# Patient Record
Sex: MALE | Race: WHITE | NOT HISPANIC OR LATINO | Employment: FULL TIME | URBAN - METROPOLITAN AREA
[De-identification: names, ages, dates, MRNs, and addresses within clinical notes are randomized per-mention and may not be internally consistent; named-entity substitution may affect disease eponyms.]

---

## 2017-05-23 ENCOUNTER — OFFICE VISIT (OUTPATIENT)
Dept: FAMILY MEDICINE | Facility: CLINIC | Age: 47
End: 2017-05-23
Payer: COMMERCIAL

## 2017-05-23 VITALS
BODY MASS INDEX: 29.2 KG/M2 | OXYGEN SATURATION: 95 % | DIASTOLIC BLOOD PRESSURE: 82 MMHG | SYSTOLIC BLOOD PRESSURE: 136 MMHG | HEART RATE: 87 BPM | WEIGHT: 204 LBS | HEIGHT: 70 IN | TEMPERATURE: 97.4 F

## 2017-05-23 DIAGNOSIS — E11.65 POORLY CONTROLLED DIABETES MELLITUS (H): Primary | ICD-10-CM

## 2017-05-23 DIAGNOSIS — M79.642 BILATERAL HAND PAIN: ICD-10-CM

## 2017-05-23 DIAGNOSIS — L72.3 SEBACEOUS CYST: ICD-10-CM

## 2017-05-23 DIAGNOSIS — Z12.83 SKIN CANCER SCREENING: ICD-10-CM

## 2017-05-23 DIAGNOSIS — Z13.89 SCREENING FOR DIABETIC PERIPHERAL NEUROPATHY: ICD-10-CM

## 2017-05-23 DIAGNOSIS — B00.1 COLD SORE: ICD-10-CM

## 2017-05-23 DIAGNOSIS — Z91.030 ALLERGIC TO BEES: ICD-10-CM

## 2017-05-23 DIAGNOSIS — D22.9 NUMEROUS MOLES: ICD-10-CM

## 2017-05-23 DIAGNOSIS — M79.641 BILATERAL HAND PAIN: ICD-10-CM

## 2017-05-23 DIAGNOSIS — Z83.3 FAMILY HISTORY OF DIABETES MELLITUS: ICD-10-CM

## 2017-05-23 LAB — HBA1C MFR BLD: 9 % (ref 4.3–6)

## 2017-05-23 PROCEDURE — 99207 C FOOT EXAM  NO CHARGE: CPT | Performed by: INTERNAL MEDICINE

## 2017-05-23 PROCEDURE — 80061 LIPID PANEL: CPT | Performed by: INTERNAL MEDICINE

## 2017-05-23 PROCEDURE — 82043 UR ALBUMIN QUANTITATIVE: CPT | Performed by: INTERNAL MEDICINE

## 2017-05-23 PROCEDURE — 99215 OFFICE O/P EST HI 40 MIN: CPT | Performed by: INTERNAL MEDICINE

## 2017-05-23 PROCEDURE — 83036 HEMOGLOBIN GLYCOSYLATED A1C: CPT | Performed by: INTERNAL MEDICINE

## 2017-05-23 PROCEDURE — 36415 COLL VENOUS BLD VENIPUNCTURE: CPT | Performed by: INTERNAL MEDICINE

## 2017-05-23 PROCEDURE — 80053 COMPREHEN METABOLIC PANEL: CPT | Performed by: INTERNAL MEDICINE

## 2017-05-23 RX ORDER — GLIMEPIRIDE 2 MG/1
TABLET ORAL
Qty: 60 TABLET | Refills: 3 | Status: SHIPPED | OUTPATIENT
Start: 2017-05-23 | End: 2017-05-30

## 2017-05-23 RX ORDER — ATORVASTATIN CALCIUM 20 MG/1
20 TABLET, FILM COATED ORAL DAILY
Qty: 90 TABLET | Refills: 1 | Status: SHIPPED | OUTPATIENT
Start: 2017-05-23 | End: 2022-01-24

## 2017-05-23 RX ORDER — METFORMIN HCL 500 MG
TABLET, EXTENDED RELEASE 24 HR ORAL
Qty: 120 TABLET | Refills: 3 | Status: SHIPPED | OUTPATIENT
Start: 2017-05-23 | End: 2022-01-24

## 2017-05-23 RX ORDER — EPINEPHRINE 0.3 MG/.3ML
0.3 INJECTION SUBCUTANEOUS
Qty: 0.3 ML | Refills: 1 | Status: SHIPPED | OUTPATIENT
Start: 2017-05-23

## 2017-05-23 RX ORDER — ACYCLOVIR 50 MG/G
CREAM TOPICAL
Qty: 5 G | Refills: 3 | Status: SHIPPED | OUTPATIENT
Start: 2017-05-23 | End: 2022-01-24

## 2017-05-23 NOTE — MR AVS SNAPSHOT
After Visit Summary   5/23/2017    Darren Tenorio    MRN: [id number]           Patient Information     Date Of Birth          1970        Visit Information        Provider Department      5/23/2017 4:00 PM Deisy Forte MD [facility]        Today's Diagnoses     Poorly controlled diabetes mellitus (H)    -  1    Allergic to bees        Screening for diabetic peripheral neuropathy        Family history of diabetes mellitus        Bilateral hand pain        Skin cancer screening        Numerous moles        Sebaceous cyst        Cold sore          Care Instructions    Take Metformin 1 tablet daily for 7 days then 2 tablets daily after that  Start taking Amaryl 2 mg daily for one week then go to twice daily.  Labs today  Make appointment with diabetic educator  Make appointment with dermatology and orthopedics  Follow up in one month  Seek sooner medical attention if there is any worsening of symptoms or problems.           Follow-ups after your visit        Additional Services     DERMATOLOGY REFERRAL       Your provider has referred you to: [facility] [place] [phone]   [email or web address]    Please be aware that coverage of these services is subject to the terms and limitations of your health insurance plan.  Call member services at your health plan with any benefit or coverage questions.      Please bring the following with you to your appointment:    (1) Any X-Rays, CTs or MRIs which have been performed.  Contact the facility where they were done to arrange for  prior to your scheduled appointment.    (2) List of current medications  (3) This referral request   (4) Any documents/labs given to you for this referral            DIABETES EDUCATOR REFERRAL       DIABETES SELF MANAGEMENT TRAINING (DSMT)      Your provider has referred you to Diabetes Education: [facility]: Diabetes Education - [facility] [phone]    [email or web address]    Type of training and number of hours: New Diagnosis: Initial group DSMT - 10 hours.      Medicare covers: 10 hours of initial DSMT in 12 month period from the time of first visit, plus 2 hours of follow-up DSMT annually, and additional hours as requested for insulin training.    Diabetes Type: Type 2 - On Oral Medication             Diabetes Co-Morbidities: dyslipidemia               A1C Goal:  <7.0       A1C is: Lab Results       Component                Value               Date                       A1C                      8.9                 07/01/2016              If an urgent visit is needed or A1C is above 12, Care Team to call the Diabetes Education Team at [phone] or send an In Basket message to the Diabetes Education Pool (P DIAB ED-PATIENT CARE).    Diabetes Education Topics: Comprehensive Knowledge Assessment and Instruction    Special Educational Needs Requiring Individual DSMT: None       MEDICAL NUTRITION THERAPY (MNT) for Diabetes    Medical Nutrition Therapy with a Registered Dietitian can be provided in coordination with Diabetes Self-Management Training to assist in achieving optimal diabetes management.    MNT Type and Hours: New diagnosis: Initial MNT - 3 hours                       Medicare will cover: 3 hours initial MNT in 12 month period after first visit, plus 2 hours of follow-up MNT annually    Please be aware that coverage of these services is subject to the terms and limitations of your health insurance plan.  Call member services at your health plan to determine Diabetes Self-Management Training benefits and ask which blood glucose monitor brands are covered by your plan.      Please bring the following with you to your appointment:    (1)  List of current medications   (2)  List of Blood Glucose Monitor brands that are covered by your insurance plan  (3)  Blood Glucose Monitor and log book  (4)   Food records  for the 3 days prior to your visit    The Certified Diabetes Educator may make diabetes medication adjustments per the CDE Protocol and Collaborative Practice Agreement.            ORTHOPEDICS ADULT REFERRAL       Your provider has referred you to: [facility]  [place] [phone]   [email or web address]     Please be aware that coverage of these services is subject to the terms and limitations of your health insurance plan.  Call member services at your health plan with any benefit or coverage questions.      Please bring the following to your appointment:    >>   Any x-rays, CTs or MRIs which have been performed.  Contact the facility where they were done to arrange for  prior to your scheduled appointment.    >>   List of current medications   >>   This referral request   >>   Any documents/labs given to you for this referral                  Who to contact     If you have questions or need follow up information about today's clinic visit or your schedule please contact [facility] directly at [phone].  Normal or non-critical lab and imaging results will be communicated to you by [employer], letter or phone within 4 business days after the clinic has received the results. If you do not hear from us within 7 days, please contact the clinic through [employer] or phone. If you have a critical or abnormal lab result, we will notify you by phone as soon as possible.  Submit refill requests through [employer] or call your pharmacy and they will forward the refill request to us. Please allow 3 business days for your refill to be completed.          Additional Information About Your Visit        [employer] Information     [employer] gives you secure access to your electronic health record. If you see a primary care provider, you can also send messages to your care team and make appointments. If you have questions, please call your primary care clinic.  If you do not have a  "primary care provider, please call [phone] and they will assist you.        Care EveryWhere ID     This is your Care EveryWhere ID. This could be used by other organizations to access your [facility] medical records  [id number]        Your Vitals Were     Pulse Temperature Height Pulse Oximetry BMI (Body Mass Index)       87 97.4  F (36.3  C) (Tympanic) 5' 9.5\" (1.765 m) 95% 29.69 kg/m2        Blood Pressure from Last 3 Encounters:   05/23/17 136/82   11/30/16 118/78   10/11/16 124/60    Weight from Last 3 Encounters:   05/23/17 204 lb (92.5 kg)   11/30/16 206 lb (93.4 kg)   10/11/16 200 lb (90.7 kg)              We Performed the Following     Albumin Random Urine Quantitative     Comprehensive metabolic panel     DERMATOLOGY REFERRAL     DIABETES EDUCATOR REFERRAL     FOOT EXAM  NO CHARGE [86972.114]     Hemoglobin A1c     Lipid Profile     ORTHOPEDICS ADULT REFERRAL          Today's Medication Changes          These changes are accurate as of: 5/23/17  4:35 PM.  If you have any questions, ask your nurse or doctor.               Start taking these medicines.        Dose/Directions    acyclovir 5 % cream   Commonly known as:  ZOVIRAX   Used for:  Cold sore   Started by:  Deisy Forte MD        Apply topically 5 times daily   Quantity:  5 g   Refills:  3       aspirin 81 MG EC tablet   Used for:  Poorly controlled diabetes mellitus (H)   Started by:  Deisy Forte MD        Dose:  81 mg   Take 1 tablet (81 mg) by mouth daily   Quantity:  90 tablet   Refills:  3       atorvastatin 20 MG tablet   Commonly known as:  LIPITOR   Used for:  Poorly controlled diabetes mellitus (H)   Started by:  Deisy Forte MD        Dose:  20 mg   Take 1 tablet (20 mg) by mouth daily   Quantity:  90 tablet   Refills:  1       blood glucose lancets standard   Commonly known as:  no brand specified   Used for:  Poorly controlled diabetes mellitus (H)   Started by:  Deisy Forte MD        Use to test blood sugar 3 " times daily or as directed.   Quantity:  1 Box   Refills:  11       blood glucose monitoring meter device kit   Commonly known as:  no brand specified   Used for:  Poorly controlled diabetes mellitus (H)   Started by:  Deisy Forte MD        Use to test blood sugar as directed.   Quantity:  1 kit   Refills:  0       blood glucose monitoring test strip   Commonly known as:  no brand specified   Used for:  Poorly controlled diabetes mellitus (H)   Started by:  Deisy Forte MD        Use to test blood sugars 3 times daily or as directed   Quantity:  100 strip   Refills:  1       glimepiride 2 MG tablet   Commonly known as:  AMARYL   Used for:  Poorly controlled diabetes mellitus (H)   Started by:  Deisy Forte MD        Take one tablet daily for one week then take one tablet twice a day after that   Quantity:  60 tablet   Refills:  3       metFORMIN 500 MG 24 hr tablet   Commonly known as:  GLUCOPHAGE-XR   Used for:  Poorly controlled diabetes mellitus (H)   Started by:  Deisy Forte MD        Take one tablet daily for 7 days then go to two tablets daily   Quantity:  120 tablet   Refills:  3            Where to get your medicines      These medications were sent to [place] [address] [place] [address] [address] [place] [address] [address] [place] [id number]     Phone:  [phone]     acyclovir 5 % cream    blood glucose lancets standard    blood glucose monitoring meter device kit    blood glucose monitoring test strip    EPINEPHrine 0.3 MG/0.3ML injection    glimepiride 2 MG tablet    metFORMIN 500 MG 24 hr tablet         Some of these will need a paper prescription and others can be bought over the counter.  Ask your nurse if you have questions.     Bring a paper prescription for each of these medications     atorvastatin 20 MG tablet       You don't need a prescription for these medications     aspirin 81 MG EC tablet                Primary Care Provider Office Phone # Fax #    Deisy Forte  [phone]       [facility]    [address] [facility] [address]  [facility] [address]        Thank you!     Thank you for choosing [facility]  for your care. Our goal is always to provide you with excellent care. Hearing back from our patients is one way we can continue to improve our services. Please take a few minutes to complete the written survey that you may receive in the mail after your visit with us. Thank you!             Your Updated Medication List - Protect others around you: Learn how to safely use, store and throw away your medicines at www.disposemymeds.org.          This list is accurate as of: 5/23/17  4:35 PM.  Always use your most recent med list.                   Brand Name Dispense Instructions for use    acyclovir 5 % cream    ZOVIRAX    5 g    Apply topically 5 times daily       aspirin 81 MG EC tablet     90 tablet    Take 1 tablet (81 mg) by mouth daily       atorvastatin 20 MG tablet    LIPITOR    90 tablet    Take 1 tablet (20 mg) by mouth daily       blood glucose lancets standard    no brand specified    1 Box    Use to test blood sugar 3 times daily or as directed.       blood glucose monitoring meter device kit    no brand specified    1 kit    Use to test blood sugar as directed.       blood glucose monitoring test strip    no brand specified    100 strip    Use to test blood sugars 3 times daily or as directed       EPINEPHrine 0.3 MG/0.3ML injection     0.3 mL    Inject 0.3 mLs (0.3 mg) into the muscle once as needed for anaphylaxis       glimepiride 2 MG tablet    AMARYL    60 tablet    Take one tablet daily for one week then take one tablet twice a day after that       metFORMIN 500 MG 24 hr tablet    GLUCOPHAGE-XR    120 tablet    Take one tablet daily for 7 days then go to two tablets daily

## 2017-05-23 NOTE — NURSING NOTE
"Chief Complaint   Patient presents with     Diabetes     Mole     on back that is changing colors       Initial /82 (BP Location: Right arm, Patient Position: Chair, Cuff Size: Adult Regular)  Pulse 87  Temp 97.4  F (36.3  C) (Tympanic)  Ht 5' 9.5\" (1.765 m)  Wt 204 lb (92.5 kg)  SpO2 95%  BMI 29.69 kg/m2 Estimated body mass index is 29.69 kg/(m^2) as calculated from the following:    Height as of this encounter: 5' 9.5\" (1.765 m).    Weight as of this encounter: 204 lb (92.5 kg).  Medication Reconciliation: complete   Tanya Clarke        "

## 2017-05-23 NOTE — PROGRESS NOTES
SUBJECTIVE:                                                    Darren Tenorio is a 46 year old male who presents to clinic today for the following health issues:      Diabetes Follow-up      Patient is checking blood sugars: rarely.  Results range from 300+ patient thinks monitor isn't working correctly    Diabetic concerns: None and blood sugar frequently over 200     Symptoms of hypoglycemia (low blood sugar): none     Paresthesias (numbness or burning in feet) or sores: No     Date of last diabetic eye exam: never     Amount of exercise or physical activity: 6-7 days/week for an average of 30-45 minutes    Problems taking medications regularly: No    Medication side effects: none    Diet: regular (no restrictions)  Patient states that he was not aware he was supposed to follow up every 3 months for his diabetes, but he was previously informed  He has not visited a diabetic educator yet, he thought that they were supposed to call him  He would like to visit one closer to his house in [place], he works in [place]  He believes that he needs a new blood sugar monitor because for the past couple of weeks his readings have been over 300 and he does not believe that they are actually that high  He reports that at one point he measured his blood sugars and the monitor read 320, then when he measured it again right after it read 280  He has not started any of the medications that were prescribed to him for his diabetes  He also complains of additional concerns including red spots on his arms, a discolored red mole on his back, and a cyst on his back that he has had for a long time (30+ years) that has now recently been growing is size    Trigger finger  Patient has a history of trigger finger bilaterally  He was referred to a hand specialist and received 2-3 injections in the last 5 years  The injections helped initially, but the effects eventually wear off for him  Currently the effect of the injections are wearing  off and his symptoms are returning  He reports that the last time he saw his hand specialist he was told he should get surgery instead of more injections  He needs to be able to use his hands properly for his job as a     Cold Sores  Patient needs a refill of the ointment he uses for cold sores  This medication has worked well for him and he would like to continue  Has tried OTC Abreva and has not noticed any significant improvement  Has not taken oral medication for this and does not want to start      Problem list and histories reviewed & adjusted, as indicated.  Additional history: as documented    Patient Active Problem List   Diagnosis     Dizzy spells     Tobacco abuse     Type 2 diabetes mellitus without complication (HCC)     Hyperlipidemia LDL goal <100     Past Surgical History:   Procedure Laterality Date     none         Social History   Substance Use Topics     Smoking status: Former Smoker     Packs/day: 0.20     Types: Cigarettes     Quit date: 4/20/2014     Smokeless tobacco: Never Used     Alcohol use No      Comment: Quit 2010     Family History   Problem Relation Age of Onset     Hypertension Father      C.A.D. Father      Coronary Artery Disease Mother      DIABETES Paternal Uncle          Current Outpatient Prescriptions   Medication Sig Dispense Refill     EPINEPHrine 0.3 MG/0.3ML injection Inject 0.3 mLs (0.3 mg) into the muscle once as needed for anaphylaxis 0.3 mL 1     Allergies   Allergen Reactions     Bees        Reviewed and updated as needed this visit by clinical staff  Tobacco  Allergies  Meds  Soc Hx      Reviewed and updated as needed this visit by Provider         ROS:  Constitutional, HEENT, cardiovascular, pulmonary, gi and gu systems are negative, except as otherwise noted.    POS for red spots on arms bilaterally, discolored mole on back, cyst on back, trigger finger bilaterally      This document serves as a record of the services and decisions personally  "performed and made by Deisy Forte MD. It was created on her behalf by Jovan Roblero, a trained medical scribe. The creation of this document is based on the provider's statements to the medical scribe.    Scriblulu Roblero 4:08 PM, May 23, 2017    OBJECTIVE:                                                    /82 (BP Location: Right arm, Patient Position: Chair, Cuff Size: Adult Regular)  Pulse 87  Temp 97.4  F (36.3  C) (Tympanic)  Ht 1.765 m (5' 9.5\")  Wt 92.5 kg (204 lb)  SpO2 95%  BMI 29.69 kg/m2  Body mass index is 29.69 kg/(m^2).  GENERAL APPEARANCE: healthy, alert and no distress  SKIN: Numerous moles and freckles on his back, large cyst on back, small red bumps on arms bilaterally  PSYCH: mentation appears normal and affect normal/bright       ASSESSMENT/PLAN:                                                      Darren was seen today for diabetes and mole.    Diagnoses and all orders for this visit:    Poorly controlled diabetes mellitus (H)  Patient was in denial about his diabetes diagnosis, but is now accepting it  He does not yet seem fully dedicated to controlling this condition  Counseled him about the additional health complications that arise with poorly controlled diabetes  Also informed him that poorly controlled diabetes affects healing and he would not be recommended for the surgeries that he is thinking about getting until his diabetes is under better control  His A1C has increased more since his last visits  Lab Results   Component Value Date    A1C 9.0 05/23/2017    A1C 8.9 07/01/2016    A1C 7.2 11/09/2015    A1C 7.7 08/26/2014   After discussing these factors and reviewing his elevated A1C results the patient seemed more inclined to control his diabetes and stated he will follow the instructions he was given  Advised patient to be careful about low blood sugars when beginning his medications and instructed him how to manage hypoglycemic events  He drives and rides motorcycle " so he should always be prepared to increase his blood sugar levels  He is going to  his medications and begin using them as prescribed  He will take Metformin 1 tablet daily for 7 days then 2 tablets daily as well as take Amaryl 2 mg daily for 7 days then Amaryl 2 mg twice daily.  He will make an appointment with a diabetic educator  He was informed that he has to schedule his appointments on his own, they will not contact him first  -     blood glucose monitoring (NO BRAND SPECIFIED) test strip; Use to test blood sugars 3 times daily or as directed  -     blood glucose monitoring (NO BRAND SPECIFIED) meter device kit; Use to test blood sugar as directed.  -     blood glucose (NO BRAND SPECIFIED) lancets standard; Use to test blood sugar 3 times daily or as directed.  -     metFORMIN (GLUCOPHAGE-XR) 500 MG 24 hr tablet; Take one tablet daily for 7 days then go to two tablets daily  -     glimepiride (AMARYL) 2 MG tablet; Take one tablet daily for one week then take one tablet twice a day after that  -     aspirin 81 MG EC tablet; Take 1 tablet (81 mg) by mouth daily  -     atorvastatin (LIPITOR) 20 MG tablet; Take 1 tablet (20 mg) by mouth daily  -     Hemoglobin A1c  -     Comprehensive metabolic panel  -     Lipid Profile  -     DIABETES EDUCATOR REFERRAL  Patient was informed about his A1c result today    Allergic to bees  Continue to keep epinephrine on hand for bee stings  -     Albumin Random Urine Quantitative  -     EPINEPHrine 0.3 MG/0.3ML injection; Inject 0.3 mLs (0.3 mg) into the muscle once as needed for anaphylaxis    Screening for diabetic peripheral neuropathy  -     deferred     Family history of diabetes mellitus  He has seen the effects of diabetes first hand in his family  Comments:  aunts and uncle    Bilateral hand pain  He will schedule an appointment with orthopedics  Will discuss treatment options for his trigger finger  -     ORTHOPEDICS ADULT REFERRAL    Skin cancer screening  He  will schedule an appointment with dermatology  Will get his moles, rash, and cysts evaluated  -     DERMATOLOGY REFERRAL    Numerous moles  See the note above  -     DERMATOLOGY REFERRAL    Sebaceous cyst with no signs of infection   It is 2cm  See the note above  Comments:  lower back  Orders:  -     DERMATOLOGY REFERRAL    Cold sore  He has been using zovirax for his cold sores and would like to continue  Abreva has not helped with cold sores in the past  He does not want to take an oral medication  -     acyclovir (ZOVIRAX) 5 % cream; Apply topically 5 times daily  Wanted zovirax.     Other orders  -     Cancel: CREATININE    The total visit time was 40 minutes more  than 50% was spent in counseling and coordination of care as discussed above.    Follow up in one month  Patient was advised to seek sooner medical attention if there is any new or worsening symptoms    The information in this document, created by the medical scribe for me, accurately reflects the services I personally performed and the decisions made by me. I have reviewed and approved this document for accuracy prior to leaving the patient care area.  Deisy Forte MD  4:47 PM, 05/23/17    Deisy Forte MD  [facility]

## 2017-05-23 NOTE — PATIENT INSTRUCTIONS
Take Metformin 1 tablet daily for 7 days then 2 tablets daily after that  Start taking Amaryl 2 mg daily for one week then go to twice daily.  Labs today  Make appointment with diabetic educator  Make appointment with dermatology and orthopedics  Follow up in one month  Seek sooner medical attention if there is any worsening of symptoms or problems.

## 2017-05-24 LAB
ALBUMIN SERPL-MCNC: 4.6 G/DL (ref 3.4–5)
ALP SERPL-CCNC: 76 U/L (ref 40–150)
ALT SERPL W P-5'-P-CCNC: 52 U/L (ref 0–70)
ANION GAP SERPL CALCULATED.3IONS-SCNC: 8 MMOL/L (ref 3–14)
AST SERPL W P-5'-P-CCNC: 21 U/L (ref 0–45)
BILIRUB SERPL-MCNC: 0.3 MG/DL (ref 0.2–1.3)
BUN SERPL-MCNC: 19 MG/DL (ref 7–30)
CALCIUM SERPL-MCNC: 9.2 MG/DL (ref 8.5–10.1)
CHLORIDE SERPL-SCNC: 107 MMOL/L (ref 94–109)
CHOLEST SERPL-MCNC: 227 MG/DL
CO2 SERPL-SCNC: 24 MMOL/L (ref 20–32)
CREAT SERPL-MCNC: 0.81 MG/DL (ref 0.66–1.25)
CREAT UR-MCNC: 116 MG/DL
GFR SERPL CREATININE-BSD FRML MDRD: ABNORMAL ML/MIN/1.7M2
GLUCOSE SERPL-MCNC: 167 MG/DL (ref 70–99)
HDLC SERPL-MCNC: 32 MG/DL
LDLC SERPL CALC-MCNC: 145 MG/DL
MICROALBUMIN UR-MCNC: 13 MG/L
MICROALBUMIN/CREAT UR: 11.12 MG/G CR (ref 0–17)
NONHDLC SERPL-MCNC: 195 MG/DL
POTASSIUM SERPL-SCNC: 3.9 MMOL/L (ref 3.4–5.3)
PROT SERPL-MCNC: 8 G/DL (ref 6.8–8.8)
SODIUM SERPL-SCNC: 139 MMOL/L (ref 133–144)
TRIGL SERPL-MCNC: 252 MG/DL

## 2017-05-24 NOTE — PROGRESS NOTES
This is to inform you regarding your test result.    Glucose which is your blood sugar is elevated.  HbA1c which is average glucose during last 3 months is 9%.  Your diabetes is poorly controlled.  We discussed the management during your office visit.  Liver and kidney function is normal.  Your total cholesterol is elevated.  The triglycerides are high. Lowering  the amount of sugar ,alcohol and sweets in the diet helps to control this.Exercise and weight loss helps.  HDL which is called good cholesterol is low.  Your LDL which is called bad cholesterol is elevated.  Eat low cholesterol low fat  diet and do regular physical activity. Avoid high sugar containing food.  Start taking lipitor which was discussed with you during office visit.  Urine for Microalbumin is normal.            Dr.Nasima Reanna MD,FACP

## 2017-05-30 ENCOUNTER — TELEPHONE (OUTPATIENT)
Dept: FAMILY MEDICINE | Facility: CLINIC | Age: 47
End: 2017-05-30

## 2017-05-30 DIAGNOSIS — E11.65 POORLY CONTROLLED DIABETES MELLITUS (H): ICD-10-CM

## 2017-05-30 RX ORDER — GLIMEPIRIDE 2 MG/1
TABLET ORAL
Qty: 60 TABLET | Refills: 3
Start: 2017-05-30 | End: 2018-07-02

## 2017-05-30 NOTE — TELEPHONE ENCOUNTER
Spoke to patient and discussed hypoglycemia and its management.  Lower the glimepiride dose from 2 mg to one mg daily.  Update me in one week how your sugars are doing?  Dr.Nasima Reanna MD

## 2017-05-30 NOTE — TELEPHONE ENCOUNTER
Reason for call:  Patient reporting a symptom    Symptom or request: Fatigue, weakness, and     Duration (how long have symptoms been present): Since he has been on the medication.     Have you been treated for this before? Yes    Additional comments: Darren Tenorio said that he was told to up his dosage after a week and he does think that is going to help. He is not feeling well.     Phone Number patient can be reached at:  Home number on file [phone] (home)    Best Time:  ASAP     Can we leave a detailed message on this number:  YES    Call taken on 5/30/2017 at 9:29 AM by Emma Yoo

## 2017-05-30 NOTE — TELEPHONE ENCOUNTER
We could but patient wanted the ointment.  So check with him to see if he is willing to switch to valtrex  or Is he going to pay out of pocket for this?  Dr.Nasima Reanna MD

## 2017-05-30 NOTE — TELEPHONE ENCOUNTER
"PCP:     Pt just started taking Metformin and Glimepiride last week - verified dosing, Pt is taking what has been prescribed.   Pt states he feels very weak at times, Pt states his blood sugars have been on the lower end (70's and 80's)  Pt states that these are blood sugars he is taking before meals. (testing TID and morning fasting)  Pt states that he sometimes feels \"drunk\" when his sugars are in the 70's. He feels better after having some sugar.   Pt was advised to increase his dosing of these medications after one week, however would like to touch base with you first.   Please advise.     Iwona Rodriguez RN     "

## 2017-05-30 NOTE — TELEPHONE ENCOUNTER
Pharmacy requesting PA for Zovirax 5%, insurance prefers: acyclovir capsules/tablets, famciclovir tablet, OR valacyclovir tablet. Can any of these alternatives replace this topical?    Mohinder Gonzalez CMA

## 2017-05-31 NOTE — TELEPHONE ENCOUNTER
Patient did mention that he has only had effective results with the Zovirax cream and has failed with several oral treatments. I submitted a PA for the cream.    Mohinder Gonzalez, CMA

## 2017-06-08 ENCOUNTER — TELEPHONE (OUTPATIENT)
Dept: FAMILY MEDICINE | Facility: CLINIC | Age: 47
End: 2017-06-08

## 2017-06-08 NOTE — TELEPHONE ENCOUNTER
Called the Pt and relayed the below information.   Pt expressed understanding and agrees with plan of care.     Iwona Rodriguez RN

## 2017-06-08 NOTE — TELEPHONE ENCOUNTER
Reason for call:  Other   Patient called regarding (reason for call): prescription for metFORMIN (GLUCOPHAGE-XR) 500 MG 24 hr tablet. Patient states this medication is not agreeing with him and he is having too many side effects. Pt asked to speak with Dr. Forte or her nurse. Please advise.   Additional comments: n/a    Phone number to reach patient:  Home number on file [phone] (home)    Best Time:  Today    Can we leave a detailed message on this number?  YES

## 2017-06-08 NOTE — TELEPHONE ENCOUNTER
"Has been on Metformin for two weeks in addition to 1 mg Glimeperide. \"Cant' tolerate these side effects!\"     States he actually stopped Glimeperide on Monday 6/5/17  due to \"all kinds of side effects. Not sure which medication was the culprit\".    [facility] terrible stomach pain (6 inches below left nipple area; burning, improved but still there all the time)  headache, constantly, daily, on top of head. \"This is not a virus\".  Reports negative exam/CT at hospital. Having somewhat looser stools, not really diarrhea, alternating with constipation.   \"I feel terrible and I know it's these medications.\"   States \"I can't take these any more\".  Most recent BS: 160 and 162 past 2 days.    Since stopping the Glimeperide on Monday, he has no improvement. He is convinced that it's the Metformin.  Wanting to d/c and restart the Glimeperide, or any other recommendations from Dr. Forte.  Please advise.  Thank you,   Rhonda Bradley, RN      "

## 2017-06-09 ENCOUNTER — TELEPHONE (OUTPATIENT)
Dept: FAMILY MEDICINE | Facility: CLINIC | Age: 47
End: 2017-06-09

## 2017-06-09 NOTE — TELEPHONE ENCOUNTER
Reason for Call:  Medication or medication refill:    Do you use a [facility]?  Name of the pharmacy and phone number for the current request:    [facility] [id number] [place] [address] .    Name of the medication requested: acyclovir (ZOVIRAX) 5 % cream THIS IS NOT COVERED By Insurance.    Other request: Insurance with cover a pill form, or prescribe another cream    Can we leave a detailed message on this number? YES    Phone number pharmacy can be reached at: [phone]    Best Time: any time    Call taken on 6/9/2017 at 10:35 AM by oLr Matt  .

## 2017-06-09 NOTE — TELEPHONE ENCOUNTER
As it notes under the PA encounter, this request was denied by the insurance stating Zovirax cream is a plan benefit exclusion. If patient would like he can appeal to his insurance company to see if they might be willing to change their mind otherwise we have done all we can here at [place] office. I had sent patient a [employer] message explaining all of this.    I will now call the pharmacy to let them know. And also left a detailed voice message letting the patient know that we are waiting on him to let us know if he needs a script for the oral formulation.    Mohinder Gonzalez, [facility]

## 2019-05-03 ENCOUNTER — HOSPITAL ENCOUNTER (EMERGENCY)
Facility: CLINIC | Age: 49
Discharge: HOME | End: 2019-05-03
Attending: EMERGENCY MEDICINE | Admitting: EMERGENCY MEDICINE
Payer: COMMERCIAL

## 2019-05-03 ENCOUNTER — APPOINTMENT (OUTPATIENT)
Dept: GENERAL RADIOLOGY | Facility: CLINIC | Age: 49
End: 2019-05-03
Attending: EMERGENCY MEDICINE
Payer: COMMERCIAL

## 2019-05-03 VITALS
DIASTOLIC BLOOD PRESSURE: 85 MMHG | BODY MASS INDEX: 31.48 KG/M2 | OXYGEN SATURATION: 97 % | WEIGHT: 216.3 LBS | SYSTOLIC BLOOD PRESSURE: 146 MMHG | TEMPERATURE: 97.1 F | RESPIRATION RATE: 18 BRPM

## 2019-05-03 DIAGNOSIS — R00.2 PALPITATIONS: ICD-10-CM

## 2019-05-03 LAB
ALBUMIN SERPL-MCNC: 4.1 G/DL (ref 3.4–5)
ALP SERPL-CCNC: 89 U/L (ref 40–150)
ALT SERPL W P-5'-P-CCNC: 50 U/L (ref 0–70)
ANION GAP SERPL CALCULATED.3IONS-SCNC: 3 MMOL/L (ref 3–14)
AST SERPL W P-5'-P-CCNC: 21 U/L (ref 0–45)
BASOPHILS # BLD AUTO: 0.1 10E9/L (ref 0–0.2)
BASOPHILS NFR BLD AUTO: 1.5 %
BILIRUB SERPL-MCNC: 0.3 MG/DL (ref 0.2–1.3)
BUN SERPL-MCNC: 16 MG/DL (ref 7–30)
CALCIUM SERPL-MCNC: 8.7 MG/DL (ref 8.5–10.1)
CHLORIDE SERPL-SCNC: 106 MMOL/L (ref 94–109)
CO2 SERPL-SCNC: 29 MMOL/L (ref 20–32)
CREAT SERPL-MCNC: 0.91 MG/DL (ref 0.66–1.25)
D DIMER PPP FEU-MCNC: 0.3 UG/ML FEU (ref 0–0.5)
DIFFERENTIAL METHOD BLD: NORMAL
EOSINOPHIL NFR BLD AUTO: 3.9 %
ERYTHROCYTE [DISTWIDTH] IN BLOOD BY AUTOMATED COUNT: 12.5 % (ref 10–15)
GFR SERPL CREATININE-BSD FRML MDRD: >90 ML/MIN/{1.73_M2}
GLUCOSE SERPL-MCNC: 261 MG/DL (ref 70–99)
HCT VFR BLD AUTO: 42.1 % (ref 40–53)
HGB BLD-MCNC: 14.4 G/DL (ref 13.3–17.7)
IMM GRANULOCYTES # BLD: 0 10E9/L (ref 0–0.4)
IMM GRANULOCYTES NFR BLD: 0.6 %
INR PPP: 1 (ref 0.86–1.14)
LYMPHOCYTES # BLD AUTO: 2.1 10E9/L (ref 0.8–5.3)
LYMPHOCYTES NFR BLD AUTO: 31.1 %
MCH RBC QN AUTO: 28.9 PG (ref 26.5–33)
MCHC RBC AUTO-ENTMCNC: 34.2 G/DL (ref 31.5–36.5)
MCV RBC AUTO: 85 FL (ref 78–100)
MONOCYTES # BLD AUTO: 0.6 10E9/L (ref 0–1.3)
MONOCYTES NFR BLD AUTO: 8.5 %
NEUTROPHILS # BLD AUTO: 3.7 10E9/L (ref 1.6–8.3)
NEUTROPHILS NFR BLD AUTO: 54.4 %
NRBC # BLD AUTO: 0 10*3/UL
NRBC BLD AUTO-RTO: 0 /100
PLATELET # BLD AUTO: 231 10E9/L (ref 150–450)
POTASSIUM SERPL-SCNC: 3.9 MMOL/L (ref 3.4–5.3)
PROT SERPL-MCNC: 7.7 G/DL (ref 6.8–8.8)
RBC # BLD AUTO: 4.98 10E12/L (ref 4.4–5.9)
SODIUM SERPL-SCNC: 138 MMOL/L (ref 133–144)
TROPONIN I SERPL-MCNC: <0.015 UG/L (ref 0–0.04)
TSH SERPL DL<=0.005 MIU/L-ACNC: 0.88 MU/L (ref 0.4–4)
WBC # BLD AUTO: 6.9 10E9/L (ref 4–11)

## 2019-05-03 PROCEDURE — 84484 ASSAY OF TROPONIN QUANT: CPT | Performed by: EMERGENCY MEDICINE

## 2019-05-03 PROCEDURE — 80053 COMPREHEN METABOLIC PANEL: CPT | Performed by: EMERGENCY MEDICINE

## 2019-05-03 PROCEDURE — 85025 COMPLETE CBC W/AUTO DIFF WBC: CPT | Performed by: EMERGENCY MEDICINE

## 2019-05-03 PROCEDURE — 84443 ASSAY THYROID STIM HORMONE: CPT | Performed by: EMERGENCY MEDICINE

## 2019-05-03 PROCEDURE — 99285 EMERGENCY DEPT VISIT HI MDM: CPT | Mod: 25

## 2019-05-03 PROCEDURE — 71046 X-RAY EXAM CHEST 2 VIEWS: CPT | Mod: TC

## 2019-05-03 PROCEDURE — 96360 HYDRATION IV INFUSION INIT: CPT

## 2019-05-03 PROCEDURE — 99284 EMERGENCY DEPT VISIT MOD MDM: CPT | Mod: 25 | Performed by: EMERGENCY MEDICINE

## 2019-05-03 PROCEDURE — 85379 FIBRIN DEGRADATION QUANT: CPT | Performed by: EMERGENCY MEDICINE

## 2019-05-03 PROCEDURE — 93010 ELECTROCARDIOGRAM REPORT: CPT | Mod: Z6 | Performed by: EMERGENCY MEDICINE

## 2019-05-03 PROCEDURE — 25000128 H RX IP 250 OP 636: Performed by: EMERGENCY MEDICINE

## 2019-05-03 PROCEDURE — 25000132 ZZH RX MED GY IP 250 OP 250 PS 637: Performed by: EMERGENCY MEDICINE

## 2019-05-03 PROCEDURE — 85610 PROTHROMBIN TIME: CPT | Performed by: EMERGENCY MEDICINE

## 2019-05-03 PROCEDURE — 93005 ELECTROCARDIOGRAM TRACING: CPT

## 2019-05-03 RX ORDER — ASPIRIN 81 MG/1
81 TABLET, CHEWABLE ORAL ONCE
Status: COMPLETED | OUTPATIENT
Start: 2019-05-03 | End: 2019-05-03

## 2019-05-03 RX ORDER — SODIUM CHLORIDE 9 MG/ML
1000 INJECTION, SOLUTION INTRAVENOUS CONTINUOUS
Status: DISCONTINUED | OUTPATIENT
Start: 2019-05-03 | End: 2019-05-03 | Stop reason: HOSPADM

## 2019-05-03 RX ADMIN — SODIUM CHLORIDE 1000 ML: 9 INJECTION, SOLUTION INTRAVENOUS at 14:55

## 2019-05-03 RX ADMIN — ASPIRIN 81 MG 81 MG: 81 TABLET ORAL at 15:59

## 2019-05-03 NOTE — ED TRIAGE NOTES
States having episodes where his heart is beating fast, states this started a couple of days ago and only lasted a few seconds. Today the episodes have been more frequent and lasting longer.
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
## 2019-05-03 NOTE — ED AVS SNAPSHOT
[facility] Emergency Department  [address] [place] [address]  Phone:  [phone]  Fax:  [phone]                                    Darren Tenorio   MRN: [id number]    Department:  [facility] Emergency Department   Date of Visit:  5/3/2019           After Visit Summary Signature Page    I have received my discharge instructions, and my questions have been answered. I have discussed any challenges I see with this plan with the nurse or doctor.    ..........................................................................................................................................  Patient/Patient Representative Signature      ..........................................................................................................................................  Patient Representative Print Name and Relationship to Patient    ..................................................               ................................................  Date                                   Time    ..........................................................................................................................................  Reviewed by Signature/Title    ...................................................              ..............................................  Date                                               Time          22EPIC Rev 08/18

## 2019-06-25 ENCOUNTER — OFFICE VISIT (OUTPATIENT)
Dept: FAMILY MEDICINE | Facility: CLINIC | Age: 49
End: 2019-06-25
Payer: COMMERCIAL

## 2019-06-25 VITALS
RESPIRATION RATE: 12 BRPM | DIASTOLIC BLOOD PRESSURE: 82 MMHG | TEMPERATURE: 97 F | HEART RATE: 70 BPM | SYSTOLIC BLOOD PRESSURE: 120 MMHG | HEIGHT: 71 IN | BODY MASS INDEX: 29.82 KG/M2 | WEIGHT: 213 LBS

## 2019-06-25 DIAGNOSIS — L08.9 SOFT TISSUE INFECTION: Primary | ICD-10-CM

## 2019-06-25 LAB
BASOPHILS # BLD AUTO: 0.1 10E9/L (ref 0–0.2)
BASOPHILS NFR BLD AUTO: 1 %
DIFFERENTIAL METHOD BLD: NORMAL
EOSINOPHIL # BLD AUTO: 0.3 10E9/L (ref 0–0.7)
EOSINOPHIL NFR BLD AUTO: 3.3 %
ERYTHROCYTE [DISTWIDTH] IN BLOOD BY AUTOMATED COUNT: 13.1 % (ref 10–15)
HCT VFR BLD AUTO: 42.8 % (ref 40–53)
HGB BLD-MCNC: 14.2 G/DL (ref 13.3–17.7)
LYMPHOCYTES # BLD AUTO: 3 10E9/L (ref 0.8–5.3)
LYMPHOCYTES NFR BLD AUTO: 29.8 %
MCH RBC QN AUTO: 28.9 PG (ref 26.5–33)
MCHC RBC AUTO-ENTMCNC: 33.2 G/DL (ref 31.5–36.5)
MCV RBC AUTO: 87 FL (ref 78–100)
MONOCYTES # BLD AUTO: 1 10E9/L (ref 0–1.3)
MONOCYTES NFR BLD AUTO: 10 %
NEUTROPHILS # BLD AUTO: 5.7 10E9/L (ref 1.6–8.3)
NEUTROPHILS NFR BLD AUTO: 55.9 %
PLATELET # BLD AUTO: 223 10E9/L (ref 150–450)
RBC # BLD AUTO: 4.92 10E12/L (ref 4.4–5.9)
WBC # BLD AUTO: 10.2 10E9/L (ref 4–11)

## 2019-06-25 PROCEDURE — 85025 COMPLETE CBC W/AUTO DIFF WBC: CPT | Performed by: NURSE PRACTITIONER

## 2019-06-25 PROCEDURE — 99214 OFFICE O/P EST MOD 30 MIN: CPT | Performed by: NURSE PRACTITIONER

## 2019-06-25 PROCEDURE — 36415 COLL VENOUS BLD VENIPUNCTURE: CPT | Performed by: NURSE PRACTITIONER

## 2019-06-25 RX ORDER — NAPROXEN 500 MG/1
500 TABLET ORAL 2 TIMES DAILY WITH MEALS
Qty: 10 TABLET | Refills: 0 | Status: SHIPPED | OUTPATIENT
Start: 2019-06-25 | End: 2022-01-29

## 2019-06-25 ASSESSMENT — PAIN SCALES - GENERAL: PAINLEVEL: EXTREME PAIN (8)

## 2019-06-25 ASSESSMENT — MIFFLIN-ST. JEOR: SCORE: 1843.29

## 2019-06-25 NOTE — PATIENT INSTRUCTIONS
CBC today.  Take antibiotic and pain medication as prescribed.  Discussed signs and symptoms to return to the ED for and potential ENT referral if antibiotics are not improving infection    Please call or return to clinic for any questions, concerns, or symptoms that are not improving or becoming worse.    Kacy Tee, CNP

## 2019-06-25 NOTE — PROGRESS NOTES
"Subjective     Darren Tenorio is a 49 year old male who presents to clinic today for the following health issues:    HPI   Concern - Facial Infection   Onset: Started a day ago.     Description:   Patient stated that pain started on the left side of his face near his gland and now has spread up his face to his temple up. Swollen on the left side of face.     Intensity: severe, 8/10    Progression of Symptoms:  worsening and constant    Accompanying Signs & Symptoms:  Patient is now experiencing ear pain on the left side. Patient states that he has had a slight cough.     Previous history of similar problem:   Yes, it was a bad tooth.     Precipitating factors:   Worsened by: Touch it.     Alleviating factors:  Improved by: IBU    Therapies Tried and outcome: 800 mg IBU- helps with the pain    Additional HPI:  Symptoms and onset as above.  Patient is adamant that his pain is not related to an infected tooth, as he states this feels different than when he had a similar problem a few years ago.  Difficult to ascertain dental history as he will say \"he has no teeth\", but then will say \"I have a few left\".  He woke up this am with swelling to left side of his face. He believes swelling might be related to an insect bite as he lives up in [facility] in the [facility].  Denies recent travel, fever, difficulty swallowing, chewing, shortness of breath.      Patient Active Problem List   Diagnosis     Dizzy spells     Type 2 diabetes mellitus without complication (HCC)     Hyperlipidemia LDL goal <100     Family history of diabetes mellitus     Past Surgical History:   Procedure Laterality Date     none         Social History     Tobacco Use     Smoking status: Former Smoker     Packs/day: 0.20     Types: Cigarettes     Last attempt to quit: 2014     Years since quittin.1     Smokeless tobacco: Never Used   Substance Use Topics     Alcohol use: No     Alcohol/week: 0.0 oz     Comment: Quit 2010     Family History   Problem " Relation Age of Onset     Hypertension Father      C.A.D. Father      Coronary Artery Disease Mother      Diabetes Paternal Uncle          Current Outpatient Medications   Medication Sig Dispense Refill     amoxicillin-clavulanate (AUGMENTIN) 875-125 MG tablet Take 1 tablet by mouth 2 times daily for 10 days 20 tablet 0     glimepiride (AMARYL) 1 MG tablet Take 1 tablet (1 mg) by mouth daily 30 tablet 1     naproxen (NAPROSYN) 500 MG tablet Take 1 tablet (500 mg) by mouth 2 times daily (with meals) 10 tablet 0     acyclovir (ZOVIRAX) 5 % cream Apply topically 5 times daily (Patient not taking: Reported on 6/25/2019) 5 g 3     aspirin 81 MG EC tablet Take 1 tablet (81 mg) by mouth daily (Patient not taking: Reported on 6/25/2019) 90 tablet 3     atorvastatin (LIPITOR) 20 MG tablet Take 1 tablet (20 mg) by mouth daily (Patient not taking: Reported on 6/25/2019) 90 tablet 1     blood glucose (NO BRAND SPECIFIED) lancets standard Use to test blood sugar 3 times daily or as directed. (Patient not taking: Reported on 6/25/2019) 1 Box 11     blood glucose monitoring (NO BRAND SPECIFIED) meter device kit Use to test blood sugar as directed. (Patient not taking: Reported on 6/25/2019) 1 kit 0     blood glucose monitoring (NO BRAND SPECIFIED) test strip Use to test blood sugars 3 times daily or as directed (Patient not taking: Reported on 6/25/2019) 100 strip 1     EPINEPHrine 0.3 MG/0.3ML injection Inject 0.3 mLs (0.3 mg) into the muscle once as needed for anaphylaxis (Patient not taking: Reported on 6/25/2019) 0.3 mL 1     metFORMIN (GLUCOPHAGE-XR) 500 MG 24 hr tablet Take one tablet daily for 7 days then go to two tablets daily (Patient not taking: Reported on 6/25/2019) 120 tablet 3     Allergies   Allergen Reactions     Bees      Recent Labs   Lab Test 05/03/19  1434 05/23/17  1639 07/01/16  1514 07/01/16  1513 11/09/15  1435 08/26/14  1407   A1C  --  9.0* 8.9*  --  7.2* 7.7*   LDL  --  145*  --  111* 164* Cannot  "estimate LDL when triglyceride exceeds 400 mg/dL  144*   HDL  --  32*  --  32*  --  31*   TRIG  --  252*  --  200*  --  447*   ALT 50 52  --   --  52  --    CR 0.91 0.81  --   --  0.89  --    GFRESTIMATED >90 >90  Non African American GFR Calc    --   --  >90  Non  GFR Calc    --    GFRESTBLACK >90 >90  African American GFR Calc    --   --  >90  African American GFR Calc    --    POTASSIUM 3.9 3.9  --   --  3.6  --    TSH 0.88  --   --   --  1.02  --       BP Readings from Last 3 Encounters:   06/25/19 120/82   05/03/19 146/85   05/23/17 136/82    Wt Readings from Last 3 Encounters:   06/25/19 96.6 kg (213 lb)   05/03/19 98.1 kg (216 lb 4.8 oz)   05/23/17 92.5 kg (204 lb)                    Reviewed and updated as needed this visit by Provider  Tobacco  Allergies  Meds  Problems  Med Hx  Surg Hx  Fam Hx         Review of Systems   ROS COMP: Constitutional, HEENT, cardiovascular, pulmonary, gi and gu systems are negative, except as otherwise noted.      Objective    /82 (BP Location: Left arm, Patient Position: Sitting, Cuff Size: Adult Large)   Pulse 70   Temp 97  F (36.1  C) (Temporal)   Resp 12   Ht 1.803 m (5' 11\")   Wt 96.6 kg (213 lb)   BMI 29.71 kg/m    Body mass index is 29.71 kg/m .  Physical Exam   GENERAL: healthy, alert and no distress  EYES: Eyes grossly normal to inspection, PERRL and conjunctivae and sclerae normal  HENT: normal cephalic/atraumatic, ear canals and TM's normal, nose and mouth without ulcers or lesions, oropharynx clear and oral mucous membranes moist - moderate swelling starting at left side of mental protuberance that radiates up along left mandible to mandibular condyle, no erythema, ecchymosis, moderate tenderness with palpation  NECK: no adenopathy, no asymmetry, masses, or scars and thyroid normal to palpation  RESP: lungs clear to auscultation - no rales, rhonchi or wheezes  CV: regular rate and rhythm, normal S1 S2, no S3 or S4, no murmur, " "click or rub, no peripheral edema and peripheral pulses strong  SKIN: no suspicious lesions or rashes  NEURO: Normal strength and tone, mentation intact and speech normal  PSYCH: mentation appears normal, affect normal/bright    Diagnostic Test Results:  Labs reviewed in Epic  CBC - unremarkable          Assessment & Plan     1. Soft tissue infection  CBC drawn today -no abnormal findings.  Will start antibiotic as below for unknown etiology of skin and soft tissue infection of left side of face.  Discussed if symptoms are not improving or becoming worse in 2-3 days, we need to consider an ENT referral.  Advised potential dental referral - but he is adamant this is not related to his teeth.  He is prescribed Naprosyn for pain control.  - CBC with platelets and differential  - amoxicillin-clavulanate (AUGMENTIN) 875-125 MG tablet; Take 1 tablet by mouth 2 times daily for 10 days  Dispense: 20 tablet; Refill: 0  - naproxen (NAPROSYN) 500 MG tablet; Take 1 tablet (500 mg) by mouth 2 times daily (with meals)  Dispense: 10 tablet; Refill: 0     BMI:   Estimated body mass index is 29.71 kg/m  as calculated from the following:    Height as of this encounter: 1.803 m (5' 11\").    Weight as of this encounter: 96.6 kg (213 lb).   Weight management plan: Discussed healthy diet and exercise guidelines        Patient Instructions   CBC today.  Take antibiotic and pain medication as prescribed.  Discussed signs and symptoms to return to the ED for and potential ENT referral if antibiotics are not improving infection    Please call or return to clinic for any questions, concerns, or symptoms that are not improving or becoming worse.    Kacy Tee CNP        Return in about 2 days (around 6/27/2019) for Symptoms Not Improving/Getting Worse.     The patient was instructed to contact clinic for non-improvement as expected/discussed, worsening symptoms, and for questions regarding medications or treatment plan. All questions " were answered to the best of my ability and the patient's satisfaction.           Kacy Tee, [facility]

## 2019-12-09 ENCOUNTER — HOSPITAL ENCOUNTER (EMERGENCY)
Facility: CLINIC | Age: 49
Discharge: HOME | End: 2019-12-09
Attending: FAMILY MEDICINE | Admitting: FAMILY MEDICINE
Payer: COMMERCIAL

## 2019-12-09 ENCOUNTER — APPOINTMENT (OUTPATIENT)
Dept: GENERAL RADIOLOGY | Facility: CLINIC | Age: 49
End: 2019-12-09
Attending: FAMILY MEDICINE
Payer: COMMERCIAL

## 2019-12-09 VITALS — DIASTOLIC BLOOD PRESSURE: 95 MMHG | OXYGEN SATURATION: 92 % | SYSTOLIC BLOOD PRESSURE: 145 MMHG | TEMPERATURE: 98.2 F

## 2019-12-09 DIAGNOSIS — R10.9 RIGHT FLANK PAIN: ICD-10-CM

## 2019-12-09 LAB
ALBUMIN UR-MCNC: NEGATIVE MG/DL
APPEARANCE UR: CLEAR
BILIRUB UR QL STRIP: NEGATIVE
COLOR UR AUTO: ABNORMAL
GLUCOSE UR STRIP-MCNC: >499 MG/DL
HGB UR QL STRIP: NEGATIVE
KETONES UR STRIP-MCNC: NEGATIVE MG/DL
LEUKOCYTE ESTERASE UR QL STRIP: NEGATIVE
NITRATE UR QL: NEGATIVE
PH UR STRIP: 6 PH (ref 5–7)
SOURCE: ABNORMAL
SP GR UR STRIP: 1.03 (ref 1–1.03)
UROBILINOGEN UR STRIP-MCNC: 0 MG/DL (ref 0–2)

## 2019-12-09 PROCEDURE — 99284 EMERGENCY DEPT VISIT MOD MDM: CPT | Mod: 25 | Performed by: FAMILY MEDICINE

## 2019-12-09 PROCEDURE — 96372 THER/PROPH/DIAG INJ SC/IM: CPT | Performed by: FAMILY MEDICINE

## 2019-12-09 PROCEDURE — 81003 URINALYSIS AUTO W/O SCOPE: CPT | Performed by: FAMILY MEDICINE

## 2019-12-09 PROCEDURE — 99284 EMERGENCY DEPT VISIT MOD MDM: CPT | Mod: Z6 | Performed by: FAMILY MEDICINE

## 2019-12-09 PROCEDURE — 71101 X-RAY EXAM UNILAT RIBS/CHEST: CPT | Mod: TC,RT

## 2019-12-09 PROCEDURE — 25000128 H RX IP 250 OP 636: Performed by: FAMILY MEDICINE

## 2019-12-09 RX ORDER — KETOROLAC TROMETHAMINE 30 MG/ML
60 INJECTION, SOLUTION INTRAMUSCULAR; INTRAVENOUS ONCE
Status: COMPLETED | OUTPATIENT
Start: 2019-12-09 | End: 2019-12-09

## 2019-12-09 RX ORDER — CYCLOBENZAPRINE HCL 10 MG
10 TABLET ORAL 3 TIMES DAILY PRN
Qty: 20 TABLET | Refills: 0 | Status: SHIPPED | OUTPATIENT
Start: 2019-12-09 | End: 2019-12-15

## 2019-12-09 RX ADMIN — KETOROLAC TROMETHAMINE 60 MG: 30 INJECTION, SOLUTION INTRAMUSCULAR at 16:46

## 2019-12-09 NOTE — ED NOTES
Spoke with pt about urine sample.  Pt at this time can not provide one, he states that he just went.

## 2019-12-09 NOTE — ED TRIAGE NOTES
Here with right flank pain that has progressed over the past few days. States he sneezed this AM and it became unbearable

## 2019-12-09 NOTE — ED AVS SNAPSHOT
[facility] Emergency Department  [address] [place] [address]  Phone:  [phone]  Fax:  [phone]                                    Darren Tenorio   MRN: [id number]    Department:  [facility] Emergency Department   Date of Visit:  12/9/2019           After Visit Summary Signature Page    I have received my discharge instructions, and my questions have been answered. I have discussed any challenges I see with this plan with the nurse or doctor.    ..........................................................................................................................................  Patient/Patient Representative Signature      ..........................................................................................................................................  Patient Representative Print Name and Relationship to Patient    ..................................................               ................................................  Date                                   Time    ..........................................................................................................................................  Reviewed by Signature/Title    ...................................................              ..............................................  Date                                               Time          22EPIC Rev 08/18

## 2019-12-09 NOTE — LETTER
December 9, 2019      To Whom It May Concern:      Darren Tenorio was seen in our Emergency Department today, 12/09/19.  I expect his condition to improve over the next 2 days.  He may return to work/school when improved.    Sincerely,        Latrell Childs MD
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
## 2019-12-16 ENCOUNTER — HEALTH MAINTENANCE LETTER (OUTPATIENT)
Age: 49
End: 2019-12-16

## 2021-12-15 NOTE — TELEPHONE ENCOUNTER
Route to [facility]dy and see if the PA for Zovirex is approved or denied.  Dr.Nasima Reanna MD     routine postpartum care  discharge home

## 2022-01-24 ENCOUNTER — HOSPITAL ENCOUNTER (OUTPATIENT)
Dept: GENERAL RADIOLOGY | Facility: CLINIC | Age: 52
Discharge: HOME | End: 2022-01-24
Attending: FAMILY MEDICINE | Admitting: FAMILY MEDICINE
Payer: MEDICAID

## 2022-01-24 ENCOUNTER — OFFICE VISIT (OUTPATIENT)
Dept: FAMILY MEDICINE | Facility: CLINIC | Age: 52
End: 2022-01-24
Payer: MEDICAID

## 2022-01-24 VITALS
HEART RATE: 104 BPM | RESPIRATION RATE: 18 BRPM | SYSTOLIC BLOOD PRESSURE: 124 MMHG | WEIGHT: 200 LBS | TEMPERATURE: 97.9 F | OXYGEN SATURATION: 98 % | DIASTOLIC BLOOD PRESSURE: 80 MMHG | HEIGHT: 71 IN | BODY MASS INDEX: 28 KG/M2

## 2022-01-24 DIAGNOSIS — E11.65 TYPE 2 DIABETES MELLITUS WITH HYPERGLYCEMIA, WITH LONG-TERM CURRENT USE OF INSULIN (H): ICD-10-CM

## 2022-01-24 DIAGNOSIS — M79.645 PAIN OF FINGER OF LEFT HAND: ICD-10-CM

## 2022-01-24 DIAGNOSIS — E78.5 HYPERLIPIDEMIA LDL GOAL <100: ICD-10-CM

## 2022-01-24 DIAGNOSIS — M79.645 PAIN OF FINGER OF LEFT HAND: Primary | ICD-10-CM

## 2022-01-24 DIAGNOSIS — Z79.4 TYPE 2 DIABETES MELLITUS WITH HYPERGLYCEMIA, WITH LONG-TERM CURRENT USE OF INSULIN (H): ICD-10-CM

## 2022-01-24 DIAGNOSIS — Z11.59 ENCOUNTER FOR HEPATITIS C SCREENING TEST FOR LOW RISK PATIENT: ICD-10-CM

## 2022-01-24 DIAGNOSIS — Z28.21 VACCINATION REFUSED BY PATIENT: ICD-10-CM

## 2022-01-24 DIAGNOSIS — Z87.891 PERSONAL HISTORY OF TOBACCO USE: ICD-10-CM

## 2022-01-24 PROBLEM — E66.811 CLASS 1 OBESITY IN ADULT: Status: ACTIVE | Noted: 2018-08-14

## 2022-01-24 LAB
ALBUMIN SERPL-MCNC: 4.4 G/DL (ref 3.4–5)
ALP SERPL-CCNC: 78 U/L (ref 40–150)
ALT SERPL W P-5'-P-CCNC: 36 U/L (ref 0–70)
ANION GAP SERPL CALCULATED.3IONS-SCNC: 1 MMOL/L (ref 3–14)
AST SERPL W P-5'-P-CCNC: 13 U/L (ref 0–45)
BILIRUB SERPL-MCNC: 0.4 MG/DL (ref 0.2–1.3)
BUN SERPL-MCNC: 13 MG/DL (ref 7–30)
CALCIUM SERPL-MCNC: 9 MG/DL (ref 8.5–10.1)
CHLORIDE BLD-SCNC: 106 MMOL/L (ref 94–109)
CHOLEST SERPL-MCNC: 206 MG/DL
CO2 SERPL-SCNC: 29 MMOL/L (ref 20–32)
CREAT SERPL-MCNC: 0.89 MG/DL (ref 0.66–1.25)
CREAT UR-MCNC: 52 MG/DL
FASTING STATUS PATIENT QL REPORTED: NO
GFR SERPL CREATININE-BSD FRML MDRD: >90 ML/MIN/1.73M2
GLUCOSE BLD-MCNC: 310 MG/DL (ref 70–99)
HBA1C MFR BLD: 9.6 % (ref 0–5.6)
HDLC SERPL-MCNC: 31 MG/DL
LDLC SERPL CALC-MCNC: 101 MG/DL
MICROALBUMIN UR-MCNC: 11 MG/L
MICROALBUMIN/CREAT UR: 21.15 MG/G CR (ref 0–17)
NONHDLC SERPL-MCNC: 175 MG/DL
POTASSIUM BLD-SCNC: 4.3 MMOL/L (ref 3.4–5.3)
PROT SERPL-MCNC: 7.9 G/DL (ref 6.8–8.8)
SODIUM SERPL-SCNC: 136 MMOL/L (ref 133–144)
TRIGL SERPL-MCNC: 368 MG/DL

## 2022-01-24 PROCEDURE — 86803 HEPATITIS C AB TEST: CPT | Performed by: FAMILY MEDICINE

## 2022-01-24 PROCEDURE — 99214 OFFICE O/P EST MOD 30 MIN: CPT | Performed by: FAMILY MEDICINE

## 2022-01-24 PROCEDURE — 36415 COLL VENOUS BLD VENIPUNCTURE: CPT | Performed by: FAMILY MEDICINE

## 2022-01-24 PROCEDURE — 82043 UR ALBUMIN QUANTITATIVE: CPT | Performed by: FAMILY MEDICINE

## 2022-01-24 PROCEDURE — 83036 HEMOGLOBIN GLYCOSYLATED A1C: CPT | Performed by: FAMILY MEDICINE

## 2022-01-24 PROCEDURE — 80053 COMPREHEN METABOLIC PANEL: CPT | Performed by: FAMILY MEDICINE

## 2022-01-24 PROCEDURE — 99207 PR FOOT EXAM NO CHARGE: CPT | Performed by: FAMILY MEDICINE

## 2022-01-24 PROCEDURE — 80061 LIPID PANEL: CPT | Performed by: FAMILY MEDICINE

## 2022-01-24 PROCEDURE — 73140 X-RAY EXAM OF FINGER(S): CPT | Mod: LT

## 2022-01-24 RX ORDER — DULAGLUTIDE 0.75 MG/.5ML
0.75 INJECTION, SOLUTION SUBCUTANEOUS
Qty: 6 ML | Refills: 0 | Status: SHIPPED | OUTPATIENT
Start: 2022-01-24 | End: 2022-02-18

## 2022-01-24 RX ORDER — DULAGLUTIDE 0.75 MG/.5ML
INJECTION, SOLUTION SUBCUTANEOUS
COMMUNITY
Start: 2022-01-11 | End: 2022-01-24

## 2022-01-24 RX ORDER — METFORMIN HCL 500 MG
500 TABLET, EXTENDED RELEASE 24 HR ORAL
Qty: 30 TABLET | Refills: 1 | Status: SHIPPED | OUTPATIENT
Start: 2022-01-24 | End: 2022-02-18

## 2022-01-24 RX ORDER — ALBUTEROL SULFATE 90 UG/1
2 AEROSOL, METERED RESPIRATORY (INHALATION)
COMMUNITY
End: 2022-11-22

## 2022-01-24 RX ORDER — ATORVASTATIN CALCIUM 20 MG/1
20 TABLET, FILM COATED ORAL DAILY
Qty: 90 TABLET | Refills: 0 | Status: SHIPPED | OUTPATIENT
Start: 2022-01-24 | End: 2022-11-22

## 2022-01-24 ASSESSMENT — PAIN SCALES - GENERAL: PAINLEVEL: SEVERE PAIN (6)

## 2022-01-24 ASSESSMENT — MIFFLIN-ST. JEOR: SCORE: 1774.32

## 2022-01-24 NOTE — PROGRESS NOTES
Assessment & Plan     (M79.645) Pain of finger of left hand  (primary encounter diagnosis)  Comment: Clinical presentation was more suggestive of neuropathic pain.  He is diabetes which is not controlled.  Physical exam was normal, no locking or sign of triggered finger appreciated.  The finger was with normal strength and its range of motion actively and passively.  Likely his symptom is due to diabetic neuropathy.  The pain is mild, not affecting his daily activity or the functionality of his hand.  Will try to better control his diabetes as mentioned below.  X-ray of the finger today and I as expected, it was normal/negative.  Encouraged to continue with his normal activities as tolerated.  Follow-up if it persists or gets worse or if hs any concern.    Plan: XR Finger Left G/E 2 Views            (E11.65,  Z79.4) Type 2 diabetes mellitus with hyperglycemia, with long-term current use of insulin (H)  Comment: I reviewed the medical record from his doctor in [place].  His blood sugar has been high as he has been out of the medication for about 3 months. He was doing really well with the Trulicity and Amaryl.  Metformin did not help.  No side effect from Metformin.  Stated that his recent eye exam was normal.    Today's Hgb A1c is 9.6.  He was informed that his diabetes is not controlled.  The goal for his hemoglobin A1c is around 7.0 and fasting blood sugar is 130 or less.  Emphasize the importance of controlling his diabetes and educated him about the potential long and short term complication associated with uncontrolled DM.  Encouraged him to continue monitoring his blood sugar 1-2 times a day, send in the blood sugar log for me to review in week to 10 days.  Discussed with about treatment options.  He would like to restart the Trulicity.  Refilled the Trulicity today.  Stopped the Amaryl and restarted him on the Metformin.  Side effect discussed and no contraindication identified.  Informed him that there  is a good chance that his insurance will not cover for the Trulicity and therefore he may need to switch to a different injectable meds such as Lantus.  Emphasized importance of regular and healthy meals.  Also emphasized on exercising, healthy diet and weight management.  UTD for retinal exam, will try to get the medical record from his eye doctor to review.  Foot care daily is recommended.  Pneumovax was was recommended but he declined today.  Labs as ordered.  F/U in 3 months, earlier as needed.      Plan: Hemoglobin A1c, Albumin Random Urine         Quantitative with Creat Ratio, FOOT EXAM,         Comprehensive metabolic panel (BMP + Alb, Alk         Phos, ALT, AST, Total. Bili, TP), dulaglutide         (TRULICITY) 0.75 MG/0.5ML pen, metFORMIN         (GLUCOPHAGE-XR) 500 MG 24 hr tablet            (E78.5) Hyperlipidemia LDL goal <100  Comment: Was doing well with the Lipitor at 20 mg daily.  Been off the medication for about 3 months.  No history of liver disease and denies excessive alcohol intake.  His liver enzymes today were normal.  The cholesterol level today remains to be slightly high.  Discussed about healthy lifestyle modification as discussed above.  Will restart her Lipitor at 20 mg daily.    Plan: Lipid panel reflex to direct LDL Fasting,         Comprehensive metabolic panel (BMP + Alb, Alk         Phos, ALT, AST, Total. Bili, TP)            (Z11.59) Encounter for hepatitis C screening test for low risk patient  Comment: Low risk, anticipate negative.      Plan: Hepatitis C antibody            (Z87.891) Personal history of tobacco use  Comment: Was smoking a pack a day for 30 years, quitted about 5 years ago.  Discussed about low-dose chest CT scan for lung cancer screening.  Pros and cons discussed.  He declined.  Encouraged him to keep the good work with not smoking.    Plan: Prof Fee: Shared Decision Making Visit for Lung        Cancer Screening            (Z28.21) Vaccination refused by  "patient  Comment: He was strongly recommended to get the COVID,, influenza, shingle, hepatitis B's and Pneumovax vaccination today but he declined.  Risk and benefit discussed, he is willing to take the risk.      He was also strongly recommended to follow-up in 2 to 3 months for physical.  I discussed with him about colon cancer screening options and he declined today.  He would like to discuss more about it at the physical exam.       BMI:   Estimated body mass index is 27.89 kg/m  as calculated from the following:    Height as of this encounter: 1.803 m (5' 11\").    Weight as of this encounter: 90.7 kg (200 lb).   Weight management plan: Discussed healthy diet and exercise guidelines      Return in about 3 months (around 4/24/2022) for Physical Exam, dm follow up.    Wilfrido Cabello Mai, MD  [facility]    Ruthie Banks is a 51 year old who presents for the following health issues     HPI     Concern - hand locking up, middle finger won't straighten left hand  Onset: been going on for a couple months, now hurting everyday  Description: locking up can't straighten  Intensity: moderate  Progression of Symptoms:  worsening  Accompanying Signs & Symptoms: worse since stopping diabetes medication  Previous history of similar problem: no just has been on going  Precipitating factors:        Worsened by: stopping diabetes medication, needs refill with new insurance   Alleviating factors:        Improved by: hot water loosens it up  Therapies tried and outcome:  none       Darren is here today for couple concerns.  First is about the pain on the left middle finger.  Been there for about about 3 months and it is getting worse, especially in the last 2 to 3 weeks.  The symptom started soon after he stopped taking the Trulicity.  Nursing notes above reviewed and confirmed with patient.  No trauma or unusual activity.  Localized to the left middle finger -from the tip to the base of the finger.  Constant " "burning, tingling, achy and \"heat\" pain.  Never had it before.  No swelling or redness.  No history of gout.  Not able to straighten the finger.  Not affecting his daily activities or fine motor skill of the fingers.  It is bothersome and concerned because he never had it before.  No neck, elbow or wrist pain.    He also has diabetes for years.  He usually sees a provider in [place] - moved to [place] recently and therefore has not seen his doctor for a while.  He was doing really well with the Trulicity and Amaryl.  Ran out of the medication for about 3 months; stated that his doctor refused to refill the med.  He has not seen his docto for over 6 months.  Her blood sugar has been high since stopping the taking theTrulicity and Amaryl, around 400s fasting.  Once he was on the medications, the blood sugar would be around 130--150 fasting.  No acute change in his vision.  No headache or dizziness.  No chest pain, shortness of breath, orthopnea or dyspnea.  Stated he has had his eye exam about 3 months ago and it was normal.  Stated that he did not respond to the Metformin.  The Trulicity works the best for him.    He also has high cholesterol and was on Lipitor.  Also been off the medication for about 3 months as well.  Tolerated the Lipitor well.  No history of liver disease.  Denied of excessive alcohol intake.  Not exercising but tried to stay as active as possible.  Also tries to eat healthier food.  No other concern.    He quit smoking about 5 years ago.  He was smoking about 1 pack/day for about 30 years.  No coughing, chest pain or shortness of breath.    Review of Systems   Constitutional, HEENT, cardiovascular, pulmonary, gi and gu systems are negative, except as otherwise noted.      Objective    /80   Pulse 104   Temp 97.9  F (36.6  C) (Temporal)   Resp 18   Ht 1.803 m (5' 11\")   Wt 90.7 kg (200 lb)   SpO2 98%   BMI 27.89 kg/m    Body mass index is 27.89 kg/m .  Physical Exam   GENERAL: " healthy, alert and no distress, speaking full sentences  EYES: Eyes grossly normal to inspection, PERRL and conjunctivae and sclerae normal.  No nystagmus.  All 4 visual fields intact.  HENT: ear canals and TM's normal.  Nares are non-congested. Oropharynx is pink and moist. No tender with palpation to the sinuses.  NECK: Supple, no lymphadenopathy or thyromegaly.  No tender with palpation to the cervical spine.  RESP: lungs clear to auscultation - no rales, rhonchi or wheezes  CV: regular rate and rhythm, no murmur, no peripheral edema with strong pedal pulses bilaterally  ABDOMEN: soft, nontender, nondistended, no palpable masses or organomegaly with normal bowel sounds  MS: no gross musculoskeletal defects noted.  No focal weakness.  Shoulder, elbow and wrist exams were normal.  Left hand exam was normal, all fingers were in a normal range of motion.  Tender/discomfort with palpation to left middle finger diffusely - no redness or swelling.  No problem with flexion or extension passively or or actively.  The finger with normal strength.  No locking joint appreciated.  NEURO: Normal strength and tone, mentation intact and speech normal.  No focal neurological deficit  Diabetic foot exam: Monofilament was normal.  No calluses.  Skin dry and clean, no open wound.  Toenails with discoloration but no thickening.      Results for orders placed or performed during the hospital encounter of 01/24/22   XR Finger Left G/E 2 Views     Status: None    Narrative    FINGER LEFT TWO OR MORE VIEWS   1/24/2022 12:09 PM     HISTORY: Pain MIP joint and MCP. Pain of finger of left hand.  COMPARISON: None available.      Impression    IMPRESSION: Anatomic alignment left long finger. No acute displaced  left long finger fracture. No significant long finger joint space  narrowing. No erosion.      HERSON FIELSD MD         SYSTEM ID:  [id number]   Results for orders placed or performed in visit on 01/24/22   Hemoglobin A1c     Status:  Abnormal   Result Value Ref Range    Hemoglobin A1C 9.6 (H) 0.0 - 5.6 %    Narrative    Results confirmed by repeat test.   Lipid panel reflex to direct LDL Fasting     Status: Abnormal   Result Value Ref Range    Cholesterol 206 (H) <200 mg/dL    Triglycerides 368 (H) <150 mg/dL    Direct Measure HDL 31 (L) >=40 mg/dL    LDL Cholesterol Calculated 101 (H) <=100 mg/dL    Non HDL Cholesterol 175 (H) <130 mg/dL    Patient Fasting > 8hrs? No     Narrative    Cholesterol  Desirable:  <200 mg/dL    Triglycerides  Normal:  Less than 150 mg/dL  Borderline High:  150-199 mg/dL  High:  200-499 mg/dL  Very High:  Greater than or equal to 500 mg/dL    Direct Measure HDL  Female:  Greater than or equal to 50 mg/dL   Male:  Greater than or equal to 40 mg/dL    LDL Cholesterol  Desirable:  <100mg/dL  Above Desirable:  100-129 mg/dL   Borderline High:  130-159 mg/dL   High:  160-189 mg/dL   Very High:  >= 190 mg/dL    Non HDL Cholesterol  Desirable:  130 mg/dL  Above Desirable:  130-159 mg/dL  Borderline High:  160-189 mg/dL  High:  190-219 mg/dL  Very High:  Greater than or equal to 220 mg/dL   Albumin Random Urine Quantitative with Creat Ratio     Status: Abnormal   Result Value Ref Range    Creatinine Urine mg/dL 52 mg/dL    Albumin Urine mg/L 11 mg/L    Albumin Urine mg/g Cr 21.15 (H) 0.00 - 17.00 mg/g Cr   Comprehensive metabolic panel (BMP + Alb, Alk Phos, ALT, AST, Total. Bili, TP)     Status: Abnormal   Result Value Ref Range    Sodium 136 133 - 144 mmol/L    Potassium 4.3 3.4 - 5.3 mmol/L    Chloride 106 94 - 109 mmol/L    Carbon Dioxide (CO2) 29 20 - 32 mmol/L    Anion Gap 1 (L) 3 - 14 mmol/L    Urea Nitrogen 13 7 - 30 mg/dL    Creatinine 0.89 0.66 - 1.25 mg/dL    Calcium 9.0 8.5 - 10.1 mg/dL    Glucose 310 (H) 70 - 99 mg/dL    Alkaline Phosphatase 78 40 - 150 U/L    AST 13 0 - 45 U/L    ALT 36 0 - 70 U/L    Protein Total 7.9 6.8 - 8.8 g/dL    Albumin 4.4 3.4 - 5.0 g/dL    Bilirubin Total 0.4 0.2 - 1.3 mg/dL    GFR  Estimate >90 >60 mL/min/1.73m2   Hepatitis C antibody     Status: Normal   Result Value Ref Range    Hepatitis C Antibody Nonreactive Nonreactive    Narrative    Assay performance characteristics have not been established for newborns, infants, and children.               Lung Cancer Screening Shared Decision Making Visit     Darren Tenorio is eligible for lung cancer screening on the basis of the information provided in my signed lung cancer screening order.     I have discussed with patient the risks and benefits of screening for lung cancer with low-dose CT.     The risks include:  radiation exposure: one low dose chest CT has as much ionizing radiation as about 15 chest x-rays or 6 months of background radiation living in [place]    false positives: 96% of positive findings/nodules are NOT cancer, but some might still require additional diagnostic evaluation, including biopsy  over-diagnosis: some slow growing cancers that might never have been clinically significant will be detected and treated unnecessarily     The benefit of early detection of lung cancer is contingent upon adherence to annual screening or more frequent follow up if indicated.     Furthermore, reaping the benefits of screening requires Darren Tenorio to be willing and physically able to undergo diagnostic procedures, if indicated. Although no specific guide is available for determining severity of comorbidities, it is reasonable to withhold screening in patients who have greater mortality risk from other diseases.     We did discuss that the only way to prevent lung cancer is to not smoke. Smoking cessation counseling was not given. No longer smoking    I did not offer risk estimation using a calculator such as this one:    ShouldIScreen

## 2022-01-24 NOTE — Clinical Note
Please let patient know that I strongly recommend him to follow-up in 3 months for physical exam and diabetes follow-up.  Please help him to schedule for one

## 2022-01-25 ENCOUNTER — TELEPHONE (OUTPATIENT)
Dept: FAMILY MEDICINE | Facility: CLINIC | Age: 52
End: 2022-01-25
Payer: MEDICAID

## 2022-01-25 LAB — HCV AB SERPL QL IA: NONREACTIVE

## 2022-01-25 NOTE — TELEPHONE ENCOUNTER
Prior Authorization Retail Medication Request    Medication/Dose: dulaglutide (TRULICITY) 0.75 MG/0.5ML pen  ICD code (if different than what is on RX):  Type 2 diabetes mellitus with hyperglycemia, with long-term current use of insulin (H) [E11.65, Z79.4]  - Primary   Previously Tried and Failed:  na  Rationale:  na    [place]  [place]  Insurance ID:  [id number]      Pharmacy Information (if different than what is on RX)  Name:  [facility]  Phone:  [phone]

## 2022-01-29 PROBLEM — Z28.21 VACCINATION REFUSED BY PATIENT: Status: ACTIVE | Noted: 2022-01-29

## 2022-01-29 PROBLEM — Z87.891 PERSONAL HISTORY OF TOBACCO USE: Status: ACTIVE | Noted: 2022-01-29

## 2022-01-31 NOTE — TELEPHONE ENCOUNTER
PRIOR AUTHORIZATION DENIED    Medication: dulaglutide (TRULICITY) 0.75 MG/0.5ML pen-DENIED    Denial Date: 1/31/2022    Denial Rational:       Appeal Information:

## 2022-01-31 NOTE — TELEPHONE ENCOUNTER
Central Prior Authorization Team   Phone: [phone]    PA Initiation    Medication: dulaglutide (TRULICITY) 0.75 MG/0.5ML pen-Initiated  Insurance Company: [place] Medicaid ([facility]) - Phone [phone] Fax [phone]  Pharmacy Filling the Rx: [id number] - [place], [place] - [address]   Filling Pharmacy Phone: [phone]  Filling Pharmacy Fax:    Start Date: 1/31/2022

## 2022-02-01 ENCOUNTER — TELEPHONE (OUTPATIENT)
Dept: FAMILY MEDICINE | Facility: CLINIC | Age: 52
End: 2022-02-01
Payer: COMMERCIAL

## 2022-02-01 NOTE — TELEPHONE ENCOUNTER
Patient has new insurance as of today so he is calling the pharmacy to have them run it again and see if this insurance will cover it. Then let us know.   Leonila Gomez MA 2/1/2022

## 2022-02-01 NOTE — TELEPHONE ENCOUNTER
Wilfrido Huntley MD  P [facility]  Please let patient know that I strongly recommend him to follow-up in 3 months for physical exam and diabetes follow-up.  Please help him to schedule for one

## 2022-02-01 NOTE — TELEPHONE ENCOUNTER
Please let patient know that his insurance will not cover for the Trulicity.  As discussed, I recommend Lantus instead.  It most likely will be covered by his insurance.  Lantus will be a daily injection.  Let me know if you are interested to try the Lantus.  If not, I recommend to follow-up discussed about treatment options.  His diabetes was not controlled with hemoglobin A1c was high.  I strongly recommended to restart the medication as soon as possible.  Thank you

## 2022-02-07 ENCOUNTER — TELEPHONE (OUTPATIENT)
Dept: FAMILY MEDICINE | Facility: CLINIC | Age: 52
End: 2022-02-07
Payer: COMMERCIAL

## 2022-02-07 NOTE — TELEPHONE ENCOUNTER
Central Prior Authorization Team   Phone: [phone]    PA Initiation    Medication: Trulicity  Insurance Company: [employer] - Phone [phone] Fax [phone]  Pharmacy Filling the Rx: [id number] - [place] - [address] [place] [address]  Filling Pharmacy Phone: [phone]  Filling Pharmacy Fax:    Start Date: 2/7/2022

## 2022-02-07 NOTE — TELEPHONE ENCOUNTER
Alvina Taylor   Coordinator      Telephone Encounter   Signed   Creation Time:  2/7/2022  9:20 AM                    Patient has new insurance.  Can you resend this again to his new insurance.                           Tag Copy     Wilfrido Huntley MD   Physician   Specialty:  Family Medicine   Telephone Encounter   Signed   Creation Time:  2/4/2022  9:08 PM                    Please initiate the prior-authorization asap.                            Tag Copy     Kimberly Arellano   Coordinator      Telephone Encounter   Signed   Creation Time:  2/3/2022 10:45 AM                    Talked to pt.  He needs a prior auth for medication.

## 2022-02-07 NOTE — TELEPHONE ENCOUNTER
PRIOR AUTHORIZATION DENIED    Medication: Trulicity    Denial Date: 2/7/2022    Denial Rational: Even though patient changed insurances (now Blue Plus) he is still covered under the state or [facility] insurance which requires patient to try/fail at least 2 preferred drugs listed below    Bydureon, Byetta, and Victoza are the preferred injectable medications.         Appeal Information: This medication was denied. If physician would like to appeal because patient has contraindication or allergy to covered medication please write letter of medical necessity and route back to PA team to initiate.  If no further action is needed please close encounter thank you.

## 2022-02-17 ENCOUNTER — MYC MEDICAL ADVICE (OUTPATIENT)
Dept: FAMILY MEDICINE | Facility: CLINIC | Age: 52
End: 2022-02-17
Payer: COMMERCIAL

## 2022-02-17 NOTE — TELEPHONE ENCOUNTER
I am not really sure what he is taking right now.  Recommend a virtual visit to discuss more about treatment.  If possible, I would like to see him tomorrow otherwise latest is Monday.  I can room him if virtual visit is made

## 2022-02-17 NOTE — PROGRESS NOTES
Darren is a 51 year old who is being evaluated via a billable telephone visit.      What phone number would you like to be contacted at? [phone]  How would you like to obtain your AVS? [place]    Assessment & Plan     (E11.65,  Z79.4) Type 2 diabetes mellitus with hyperglycemia, with long-term current use of insulin (H)  Comment: Last month's hemoglobin A1c was 9.9.  Stated that he was doing well with the Trulicity and Amaryl - his DM was well controlled.  Has been off the Trulicity for several months due to insurance coverage.  Been taking the Metformin 500 mg daily and Amaryl 1 mg daily is in the last month.  His blood sugar has been high.  Not interested to be on insulin or injectable treatment option.  Comorbidity include high high cholesterol and obesity.  No history of CAD or CVA.    Emphasized importance of controlling his diabetes; the goal for his hemoglobin A1c is to be less than 7.0.  Treatment options discussed.  He again did not want to be on insulin or injectable treatment.  Will increase the Metformin to 1000 mg twice a day and Amaryl to 4 mg daily.  Potential side effect discussed.  Continue to monitor the blood sugar closely and let me know how it looks in the next 10 days.  May have to add a third agent such as Januvia or Jardiance if the blood sugar remained to be above the goal of 140 fasting.    Emphasized on important healthy diet, exercising and weight loss.  Follow-up as scheduled in May for physical and diabetes follow-up.  He felt comfortable with the plan and all of his questions were answered.    Plan: metFORMIN (GLUCOPHAGE-XR) 500 MG 24 hr tablet,         blood glucose (NO BRAND SPECIFIED) test strip            19 minutes spent on the date of the encounter doing chart review, history and exam, documentation and further activities per the note           Return in about 3 months (around 5/18/2022) for dm follow up, Physical Exam.    Wilfrido Cabello Mai, MD  [facility]  NOY Banks is a 51 year old who presents for the following health issues     HPI     Diabetes Follow-up    How often are you checking your blood sugar? Two times daily  Blood sugar testing frequency justification:  Uncontrolled diabetes  What time of day are you checking your blood sugars (select all that apply)?  checks when he feels like it is high  Have you had any blood sugars above 200?  Yes   Have you had any blood sugars below 70?  No    What symptoms do you notice when your blood sugar is low?  None    What concerns do you have today about your diabetes? None and Blood sugar is often over 200     Do you have any of these symptoms? (Select all that apply)  No numbness or tingling in feet.  No redness, sores or blisters on feet.  No complaints of excessive thirst.  No reports of blurry vision.  No significant changes to weight.    Have you had a diabetic eye exam in the last 12 months? No        BP Readings from Last 2 Encounters:   01/24/22 124/80   12/09/19 (!) 145/95     Hemoglobin A1C POCT (%)   Date Value   05/23/2017 9.0 (H)   07/01/2016 8.9 (H)     Hemoglobin A1C (%)   Date Value   01/24/2022 9.6 (H)     LDL Cholesterol Calculated (mg/dL)   Date Value   01/24/2022 101 (H)   05/23/2017 145 (H)   07/01/2016 111 (H)         Darren was seen today for follow-up on his diabetes.  Was doing well with the Trulicity and Amaryl for years.  His health insurance did not cover for the Trulicity and therefore been off it for several months.  Been only taking the Metformin 500 mg daily and Amaryl 1 mg daily in the last month.  Blood sugar has been high, around 200-300 fasting.  He would like to be on Trulicity but is aware that his insurance is not paying for it.  He does not want to be on insulins or injectable treatment.  No headache or dizziness.  No chest pain or shortness of breath.  No numbness or tingling sensation.  No acute change in his vision.  He checks his blood sugar about twice a  day.  Been trying to stay active but not exercising.  Also tried eat healthier diet.    Review of Systems   Constitutional, HEENT, cardiovascular, pulmonary, gi and gu systems are negative, except as otherwise noted.      Objective           Vitals:  No vitals were obtained today due to virtual visit.    Physical Exam   healthy, alert and no distress  PSYCH: Alert and oriented times 3; coherent speech, normal   rate and volume, able to articulate logical thoughts, able   to abstract reason, no tangential thoughts, no hallucinations   or delusions  His affect is normal  RESP: No cough, no audible wheezing, able to talk in full sentences  Remainder of exam unable to be completed due to telephone visits    No results found for any visits on 02/18/22.            Phone call duration: 7 minutes  Started time: 8:38 AM  Ended time: 8:45 AM

## 2022-02-18 ENCOUNTER — VIRTUAL VISIT (OUTPATIENT)
Dept: FAMILY MEDICINE | Facility: CLINIC | Age: 52
End: 2022-02-18
Payer: COMMERCIAL

## 2022-02-18 DIAGNOSIS — E11.65 TYPE 2 DIABETES MELLITUS WITH HYPERGLYCEMIA, WITH LONG-TERM CURRENT USE OF INSULIN (H): ICD-10-CM

## 2022-02-18 DIAGNOSIS — Z79.4 TYPE 2 DIABETES MELLITUS WITH HYPERGLYCEMIA, WITH LONG-TERM CURRENT USE OF INSULIN (H): ICD-10-CM

## 2022-02-18 PROCEDURE — 99214 OFFICE O/P EST MOD 30 MIN: CPT | Mod: 95 | Performed by: FAMILY MEDICINE

## 2022-02-18 RX ORDER — GLIMEPIRIDE 4 MG/1
4 TABLET ORAL DAILY
Qty: 30 TABLET | Refills: 3 | Status: SHIPPED | OUTPATIENT
Start: 2022-02-18 | End: 2022-11-22

## 2022-02-18 RX ORDER — METFORMIN HCL 500 MG
1000 TABLET, EXTENDED RELEASE 24 HR ORAL 2 TIMES DAILY WITH MEALS
Qty: 360 TABLET | Refills: 0 | Status: SHIPPED | OUTPATIENT
Start: 2022-02-18 | End: 2022-11-22

## 2022-02-19 ENCOUNTER — HEALTH MAINTENANCE LETTER (OUTPATIENT)
Age: 52
End: 2022-02-19

## 2022-04-27 ENCOUNTER — TELEPHONE (OUTPATIENT)
Dept: FAMILY MEDICINE | Facility: CLINIC | Age: 52
End: 2022-04-27
Payer: COMMERCIAL

## 2022-04-27 NOTE — TELEPHONE ENCOUNTER
Prior Authorization Retail Medication Request    Medication/Dose: Trulicity 0.75mg/0.5mg  ICD code (if different than what is on RX):  Type 2 diabetes mellitus with hyperglycemia, without long-term current use of insulin (H) [E11.65]  - Primary   Previously Tried and Failed:    Rationale:      Insurance Name:  [employer] [place]    Insurance ID:  [id number]      Pharmacy Information (if different than what is on RX)  Name:  [id number] [place], [place] - [address] [place] [address] AVENUE   Phone: [phone]

## 2022-04-29 NOTE — TELEPHONE ENCOUNTER
Please see original encounter 2/7/22 denial. If the provider would like to appeal, please provide a letter of medical necessity and route back to the team using original encounter(see below). Otherwise you can close this encounter. Thank you, Central PA Team

## 2022-05-04 NOTE — TELEPHONE ENCOUNTER
"Are you going to be changing the medication to one of the \"preferred\" medications listed, or are you going to wait and discuss this with the patient at his visit next week?    Arielle RIVERAO/    "

## 2022-05-05 NOTE — TELEPHONE ENCOUNTER
Patient notified, states he has other insurance that covers the medication. I told patient he would need to call the pharmacy to make sure they are sending to correct insurance.    Arielle Newman XRO/

## 2022-06-03 ENCOUNTER — APPOINTMENT (OUTPATIENT)
Dept: GENERAL RADIOLOGY | Facility: CLINIC | Age: 52
End: 2022-06-03
Attending: EMERGENCY MEDICINE
Payer: COMMERCIAL

## 2022-06-03 ENCOUNTER — HOSPITAL ENCOUNTER (EMERGENCY)
Facility: CLINIC | Age: 52
Discharge: HOME | End: 2022-06-03
Attending: EMERGENCY MEDICINE | Admitting: EMERGENCY MEDICINE
Payer: COMMERCIAL

## 2022-06-03 VITALS
HEART RATE: 72 BPM | OXYGEN SATURATION: 100 % | TEMPERATURE: 97.9 F | WEIGHT: 195 LBS | RESPIRATION RATE: 13 BRPM | DIASTOLIC BLOOD PRESSURE: 82 MMHG | BODY MASS INDEX: 27.2 KG/M2 | SYSTOLIC BLOOD PRESSURE: 103 MMHG

## 2022-06-03 DIAGNOSIS — R07.9 CHEST PAIN, UNSPECIFIED TYPE: ICD-10-CM

## 2022-06-03 LAB
ALBUMIN SERPL-MCNC: 4.1 G/DL (ref 3.4–5)
ALP SERPL-CCNC: 84 U/L (ref 40–150)
ALT SERPL W P-5'-P-CCNC: 33 U/L (ref 0–70)
ANION GAP SERPL CALCULATED.3IONS-SCNC: 7 MMOL/L (ref 3–14)
AST SERPL W P-5'-P-CCNC: 14 U/L (ref 0–45)
BASOPHILS # BLD AUTO: 0.1 10E3/UL (ref 0–0.2)
BASOPHILS NFR BLD AUTO: 2 %
BILIRUB SERPL-MCNC: 0.3 MG/DL (ref 0.2–1.3)
BUN SERPL-MCNC: 15 MG/DL (ref 7–30)
CALCIUM SERPL-MCNC: 8.8 MG/DL (ref 8.5–10.1)
CHLORIDE BLD-SCNC: 108 MMOL/L (ref 94–109)
CO2 SERPL-SCNC: 24 MMOL/L (ref 20–32)
CREAT SERPL-MCNC: 0.84 MG/DL (ref 0.66–1.25)
D DIMER PPP FEU-MCNC: <0.27 UG/ML FEU (ref 0–0.5)
EOSINOPHIL # BLD AUTO: 0.3 10E3/UL (ref 0–0.7)
EOSINOPHIL NFR BLD AUTO: 4 %
ERYTHROCYTE [DISTWIDTH] IN BLOOD BY AUTOMATED COUNT: 11.9 % (ref 10–15)
GFR SERPL CREATININE-BSD FRML MDRD: >90 ML/MIN/1.73M2
GLUCOSE BLD-MCNC: 234 MG/DL (ref 70–99)
HCT VFR BLD AUTO: 41.6 % (ref 40–53)
HGB BLD-MCNC: 14.1 G/DL (ref 13.3–17.7)
IMM GRANULOCYTES # BLD: 0 10E3/UL
IMM GRANULOCYTES NFR BLD: 1 %
LIPASE SERPL-CCNC: 370 U/L (ref 73–393)
LYMPHOCYTES # BLD AUTO: 2.1 10E3/UL (ref 0.8–5.3)
LYMPHOCYTES NFR BLD AUTO: 32 %
MCH RBC QN AUTO: 28.9 PG (ref 26.5–33)
MCHC RBC AUTO-ENTMCNC: 33.9 G/DL (ref 31.5–36.5)
MCV RBC AUTO: 85 FL (ref 78–100)
MONOCYTES # BLD AUTO: 0.6 10E3/UL (ref 0–1.3)
MONOCYTES NFR BLD AUTO: 9 %
NEUTROPHILS # BLD AUTO: 3.4 10E3/UL (ref 1.6–8.3)
NEUTROPHILS NFR BLD AUTO: 52 %
NRBC # BLD AUTO: 0 10E3/UL
NRBC BLD AUTO-RTO: 0 /100
PLATELET # BLD AUTO: 257 10E3/UL (ref 150–450)
POTASSIUM BLD-SCNC: 3.8 MMOL/L (ref 3.4–5.3)
PROT SERPL-MCNC: 7.6 G/DL (ref 6.8–8.8)
RBC # BLD AUTO: 4.88 10E6/UL (ref 4.4–5.9)
SODIUM SERPL-SCNC: 139 MMOL/L (ref 133–144)
TROPONIN I SERPL HS-MCNC: 3 NG/L
TROPONIN I SERPL HS-MCNC: 4 NG/L
WBC # BLD AUTO: 6.4 10E3/UL (ref 4–11)

## 2022-06-03 PROCEDURE — 84484 ASSAY OF TROPONIN QUANT: CPT | Performed by: EMERGENCY MEDICINE

## 2022-06-03 PROCEDURE — 99285 EMERGENCY DEPT VISIT HI MDM: CPT | Mod: 25 | Performed by: EMERGENCY MEDICINE

## 2022-06-03 PROCEDURE — 93010 ELECTROCARDIOGRAM REPORT: CPT | Performed by: EMERGENCY MEDICINE

## 2022-06-03 PROCEDURE — 83690 ASSAY OF LIPASE: CPT | Performed by: EMERGENCY MEDICINE

## 2022-06-03 PROCEDURE — 71045 X-RAY EXAM CHEST 1 VIEW: CPT

## 2022-06-03 PROCEDURE — 80053 COMPREHEN METABOLIC PANEL: CPT | Performed by: EMERGENCY MEDICINE

## 2022-06-03 PROCEDURE — 82040 ASSAY OF SERUM ALBUMIN: CPT | Performed by: EMERGENCY MEDICINE

## 2022-06-03 PROCEDURE — 93005 ELECTROCARDIOGRAM TRACING: CPT | Performed by: EMERGENCY MEDICINE

## 2022-06-03 PROCEDURE — 85025 COMPLETE CBC W/AUTO DIFF WBC: CPT | Performed by: EMERGENCY MEDICINE

## 2022-06-03 PROCEDURE — 85379 FIBRIN DEGRADATION QUANT: CPT | Performed by: EMERGENCY MEDICINE

## 2022-06-03 PROCEDURE — 250N000013 HC RX MED GY IP 250 OP 250 PS 637: Performed by: EMERGENCY MEDICINE

## 2022-06-03 PROCEDURE — 36415 COLL VENOUS BLD VENIPUNCTURE: CPT | Performed by: EMERGENCY MEDICINE

## 2022-06-03 RX ORDER — ASPIRIN 81 MG/1
324 TABLET, CHEWABLE ORAL ONCE
Status: COMPLETED | OUTPATIENT
Start: 2022-06-03 | End: 2022-06-03

## 2022-06-03 RX ADMIN — NITROGLYCERIN 15 MG: 20 OINTMENT TOPICAL at 12:44

## 2022-06-03 RX ADMIN — ASPIRIN 81 MG 324 MG: 81 TABLET ORAL at 12:38

## 2022-06-03 NOTE — LETTER
Mriiam 3, 2022      To Whom It May Concern:      Darren MARIO Tenorio was seen in our Emergency Department today, 06/03/22.  Please excuse him from work tonight.    Sincerely,        Victoria York MD
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
## 2022-06-03 NOTE — ED TRIAGE NOTES
Chest pain that started while doing yard work this morning at 1100.      Triage Assessment     Row Name 06/03/22 1156       Triage Assessment (Adult)    Airway WDL WDL       Respiratory WDL    Respiratory WDL WDL       Skin Circulation/Temperature WDL    Skin Circulation/Temperature WDL WDL       Cardiac WDL    Cardiac WDL X;chest pain       Peripheral/Neurovascular WDL    Peripheral Neurovascular WDL WDL       Cognitive/Neuro/Behavioral WDL    Cognitive/Neuro/Behavioral WDL WDL

## 2022-06-03 NOTE — DISCHARGE INSTRUCTIONS
Both of your troponin levels are within normal limits.  This means that there is no obvious active heart damage.    This may be due to some bowel gas or another cause but with your history I would recommend follow-up with your primary care provider.  You can discuss possible stress test.    Return to the ED at anytime for worsening, changes or concerns.    I hope that you enjoy the rest of the weekend and have a wonderful birthday!

## 2022-06-29 ENCOUNTER — MYC REFILL (OUTPATIENT)
Dept: FAMILY MEDICINE | Facility: CLINIC | Age: 52
End: 2022-06-29

## 2022-06-29 DIAGNOSIS — E11.65 TYPE 2 DIABETES MELLITUS WITH HYPERGLYCEMIA, WITHOUT LONG-TERM CURRENT USE OF INSULIN (H): ICD-10-CM

## 2022-07-01 RX ORDER — DULAGLUTIDE 0.75 MG/.5ML
0.75 INJECTION, SOLUTION SUBCUTANEOUS
Qty: 6 ML | Refills: 0 | Status: SHIPPED | OUTPATIENT
Start: 2022-07-01 | End: 2022-09-21

## 2022-07-01 NOTE — TELEPHONE ENCOUNTER
"Pending Prescriptions:                       Disp   Refills    dulaglutide (TRULICITY) 0.75 MG/0.5ML pen  6 mL   0        Sig: Inject 0.75 mg Subcutaneous every 7 days    Routing refill request to provider for review/approval because:  Labs not current:      Requested Prescriptions   Pending Prescriptions Disp Refills    dulaglutide (TRULICITY) 0.75 MG/0.5ML pen 6 mL 0     Sig: Inject 0.75 mg Subcutaneous every 7 days        GLP-1 Agonists Protocol Failed - 7/1/2022  7:22 AM        Failed - HgbA1C in past 3 or 6 months     If HgbA1C is 8 or greater, it needs to be on file within the past 3 months.  If less than 8, must be on file within the past 6 months.     Recent Labs   Lab Test 01/24/22  1156   A1C 9.6*             Passed - Medication is active on med list        Passed - Patient is age 18 or older        Passed - Normal serum creatinine on file in past 12 months     Recent Labs   Lab Test 06/03/22  1244   CR 0.84       Ok to refill medication if creatinine is low          Passed - Recent (6 mo) or future (30 days) visit within the authorizing provider's specialty     Patient had office visit in the last 6 months or has a visit in the next 30 days with authorizing provider.  See \"Patient Info\" tab in inbasket, or \"Choose Columns\" in Meds & Orders section of the refill encounter.                      "

## 2022-08-06 ENCOUNTER — HEALTH MAINTENANCE LETTER (OUTPATIENT)
Age: 52
End: 2022-08-06

## 2022-09-21 ENCOUNTER — MYC REFILL (OUTPATIENT)
Dept: FAMILY MEDICINE | Facility: CLINIC | Age: 52
End: 2022-09-21

## 2022-09-21 DIAGNOSIS — E11.65 TYPE 2 DIABETES MELLITUS WITH HYPERGLYCEMIA, WITHOUT LONG-TERM CURRENT USE OF INSULIN (H): ICD-10-CM

## 2022-09-21 NOTE — LETTER
[address] [address]  Phone: [phone]  Fax: [phone]        September 28, 2022      Darren Tenorio                                                                                                                                [id number] [address] [address]            Dear Mr. Tenorio,    We are concerned about your health care.  We recently provided you with a medication refill.  Many medications require routine follow-up with your Doctor.      At this time we ask that: You schedule an appointment for your annual physical and a medication recheck for your diabetes. Please call the clinic at [phone] Option 1 to schedule.     Your prescription: Has been refilled for 1 time so you may have time for the above noted follow-up.      Thank you,      Wilfrido Huntley MD  Care Team

## 2022-09-26 RX ORDER — DULAGLUTIDE 0.75 MG/.5ML
0.75 INJECTION, SOLUTION SUBCUTANEOUS
Qty: 2 ML | Refills: 0 | Status: SHIPPED | OUTPATIENT
Start: 2022-09-26 | End: 2022-11-15

## 2022-09-26 NOTE — TELEPHONE ENCOUNTER
1 month supply refilled.  Must follow-up before med run out.  Will need a physical, diabetes follow-up and general follow-up, 40 minutes.

## 2022-09-26 NOTE — TELEPHONE ENCOUNTER
"Requested Prescriptions   Pending Prescriptions Disp Refills    dulaglutide (TRULICITY) 0.75 MG/0.5ML pen 6 mL 0     Sig: Inject 0.75 mg Subcutaneous every 7 days        GLP-1 Agonists Protocol Failed - 9/23/2022  8:05 AM        Failed - HgbA1C in past 3 or 6 months     If HgbA1C is 8 or greater, it needs to be on file within the past 3 months.  If less than 8, must be on file within the past 6 months.     Recent Labs   Lab Test 01/24/22  1156   A1C 9.6*             Failed - Recent (6 mo) or future (30 days) visit within the authorizing provider's specialty     Patient had office visit in the last 6 months or has a visit in the next 30 days with authorizing provider.  See \"Patient Info\" tab in inbasket, or \"Choose Columns\" in Meds & Orders section of the refill encounter.            Passed - Medication is active on med list        Passed - Patient is age 18 or older        Passed - Normal serum creatinine on file in past 12 months     Recent Labs   Lab Test 06/03/22  1244   CR 0.84       Ok to refill medication if creatinine is low                Joyce Jackson, ROLANDON, RN     "

## 2022-10-22 ENCOUNTER — HEALTH MAINTENANCE LETTER (OUTPATIENT)
Age: 52
End: 2022-10-22

## 2022-11-15 DIAGNOSIS — E11.65 TYPE 2 DIABETES MELLITUS WITH HYPERGLYCEMIA, WITHOUT LONG-TERM CURRENT USE OF INSULIN (H): ICD-10-CM

## 2022-11-15 RX ORDER — DULAGLUTIDE 0.75 MG/.5ML
0.75 INJECTION, SOLUTION SUBCUTANEOUS
Qty: 2 ML | Refills: 0 | Status: SHIPPED | OUTPATIENT
Start: 2022-11-15 | End: 2022-11-22

## 2022-11-22 ENCOUNTER — OFFICE VISIT (OUTPATIENT)
Dept: FAMILY MEDICINE | Facility: CLINIC | Age: 52
End: 2022-11-22
Payer: COMMERCIAL

## 2022-11-22 VITALS
HEIGHT: 69 IN | OXYGEN SATURATION: 99 % | DIASTOLIC BLOOD PRESSURE: 82 MMHG | BODY MASS INDEX: 29.38 KG/M2 | TEMPERATURE: 98 F | WEIGHT: 198.4 LBS | RESPIRATION RATE: 18 BRPM | SYSTOLIC BLOOD PRESSURE: 128 MMHG | HEART RATE: 78 BPM

## 2022-11-22 DIAGNOSIS — E11.65 TYPE 2 DIABETES MELLITUS WITH HYPERGLYCEMIA, WITHOUT LONG-TERM CURRENT USE OF INSULIN (H): ICD-10-CM

## 2022-11-22 DIAGNOSIS — Z12.11 SCREEN FOR COLON CANCER: ICD-10-CM

## 2022-11-22 DIAGNOSIS — Z87.891 PERSONAL HISTORY OF TOBACCO USE: ICD-10-CM

## 2022-11-22 DIAGNOSIS — E66.3 OVERWEIGHT: ICD-10-CM

## 2022-11-22 DIAGNOSIS — Z00.00 ROUTINE GENERAL MEDICAL EXAMINATION AT A HEALTH CARE FACILITY: Primary | ICD-10-CM

## 2022-11-22 DIAGNOSIS — E78.5 HYPERLIPIDEMIA LDL GOAL <100: ICD-10-CM

## 2022-11-22 LAB
ALBUMIN SERPL-MCNC: 4.4 G/DL (ref 3.4–5)
ALP SERPL-CCNC: 82 U/L (ref 40–150)
ALT SERPL W P-5'-P-CCNC: 38 U/L (ref 0–70)
ANION GAP SERPL CALCULATED.3IONS-SCNC: 4 MMOL/L (ref 3–14)
AST SERPL W P-5'-P-CCNC: 12 U/L (ref 0–45)
BILIRUB SERPL-MCNC: 0.4 MG/DL (ref 0.2–1.3)
BUN SERPL-MCNC: 18 MG/DL (ref 7–30)
CALCIUM SERPL-MCNC: 8.5 MG/DL (ref 8.5–10.1)
CHLORIDE BLD-SCNC: 108 MMOL/L (ref 94–109)
CHOLEST SERPL-MCNC: 182 MG/DL
CO2 SERPL-SCNC: 29 MMOL/L (ref 20–32)
CREAT SERPL-MCNC: 0.95 MG/DL (ref 0.66–1.25)
FASTING STATUS PATIENT QL REPORTED: YES
GFR SERPL CREATININE-BSD FRML MDRD: >90 ML/MIN/1.73M2
GLUCOSE BLD-MCNC: 181 MG/DL (ref 70–99)
HBA1C MFR BLD: 10.3 % (ref 0–5.6)
HDLC SERPL-MCNC: 33 MG/DL
LDLC SERPL CALC-MCNC: 109 MG/DL
NONHDLC SERPL-MCNC: 149 MG/DL
POTASSIUM BLD-SCNC: 4 MMOL/L (ref 3.4–5.3)
PROT SERPL-MCNC: 7.7 G/DL (ref 6.8–8.8)
PSA SERPL-MCNC: 1.06 UG/L (ref 0–4)
SODIUM SERPL-SCNC: 141 MMOL/L (ref 133–144)
TRIGL SERPL-MCNC: 199 MG/DL

## 2022-11-22 PROCEDURE — G0103 PSA SCREENING: HCPCS | Performed by: FAMILY MEDICINE

## 2022-11-22 PROCEDURE — 80053 COMPREHEN METABOLIC PANEL: CPT | Performed by: FAMILY MEDICINE

## 2022-11-22 PROCEDURE — 99214 OFFICE O/P EST MOD 30 MIN: CPT | Mod: 25 | Performed by: FAMILY MEDICINE

## 2022-11-22 PROCEDURE — 99396 PREV VISIT EST AGE 40-64: CPT | Performed by: FAMILY MEDICINE

## 2022-11-22 PROCEDURE — 36415 COLL VENOUS BLD VENIPUNCTURE: CPT | Performed by: FAMILY MEDICINE

## 2022-11-22 PROCEDURE — 80061 LIPID PANEL: CPT | Performed by: FAMILY MEDICINE

## 2022-11-22 PROCEDURE — 83036 HEMOGLOBIN GLYCOSYLATED A1C: CPT | Performed by: FAMILY MEDICINE

## 2022-11-22 RX ORDER — DULAGLUTIDE 0.75 MG/.5ML
0.75 INJECTION, SOLUTION SUBCUTANEOUS
Qty: 2 ML | Refills: 0 | Status: SHIPPED | OUTPATIENT
Start: 2022-11-22

## 2022-11-22 ASSESSMENT — PAIN SCALES - GENERAL: PAINLEVEL: NO PAIN (0)

## 2022-11-22 ASSESSMENT — ENCOUNTER SYMPTOMS: MYALGIAS: 1

## 2022-11-22 NOTE — PROGRESS NOTES
SUBJECTIVE:   CC: Darren is an 52 year old who presents for preventative health visit.     Patient has been advised of split billing requirements and indicates understanding: Yes     Healthy Habits:     Getting at least 3 servings of Calcium per day:  Yes    Bi-annual eye exam:  Yes    Dental care twice a year:  NO    Sleep apnea or symptoms of sleep apnea:  None    Diet:  Regular (no restrictions)    Frequency of exercise:  2-3 days/week    Duration of exercise:  15-30 minutes    Taking medications regularly:  Yes    Medication side effects:  Not applicable    PHQ-2 Total Score: 0    Additional concerns today:  No      Diabetes Follow-up    How often are you checking your blood sugar? Two times daily  Blood sugar testing frequency justification:  Adjustment of medication(s)  What time of day are you checking your blood sugars (select all that apply)?  Before meals and mornings  Have you had any blood sugars above 200?  Yes and some over 400  Have you had any blood sugars below 70?  No    What symptoms do you notice when your blood sugar is low?  None    What concerns do you have today about your diabetes? None and Other:Too high without Trulicity     Do you have any of these symptoms? (Select all that apply)  Excessive thirst, Blurry vision and Weight gain    Have you had a diabetic eye exam in the last 12 months? Yes-  Location: [facility]      Darren is here today for physical general follow-up.    He takes Trulicity for diabetes.  Been on it for years and tolerating well.  Blood sugar at home has been running about 1 5200 fasting.  No hypoglycemic symptoms.  No chest pain or shortness of breath.  No acute change in his vision.  No leg swelling, orthopnea or dyspnea.  No numbness or tingling sensation.  Otherwise healthy, no high blood pressure, high cholesterol, CAD, CVA or PAD.  Not exercising.    Otherwise, he is doing well.  No major medical care or procedure done since the last physical couple years ago. No  headache, dizziness, or acute visual change. No runny nose, nasal congestion, coughing, fever or chill. No CP/SOB. No N/V/D/C or problem with urination.  No abdominal pain. Denied of STD risks.  No leg swelling or pain.  Not exercising but his work is very labor-intensive. Social drinker but no drug use.  Quit smoking since . No problem with sleeping.  Feels safe, lives with his wife.  No weight change and denied of being depressed. No other concern today        BP Readings from Last 2 Encounters:   22 128/82   22 103/82     Hemoglobin A1C (%)   Date Value   2022 10.3 (H)   2022 9.6 (H)   2017 9.0 (H)   2016 8.9 (H)     LDL Cholesterol Calculated (mg/dL)   Date Value   2022 109 (H)   2022 101 (H)   2017 145 (H)   2016 111 (H)                 Today's PHQ-2 Score:   PHQ-2 ( 1999 Pf[employer]) 2022   Q1: Little interest or pleasure in doing things 0   Q2: Feeling down, depressed or hopeless 0   PHQ-2 Score 0   Q1: Little interest or pleasure in doing things Not at all   Q2: Feeling down, depressed or hopeless Not at all   PHQ-2 Score 0       Have you ever done Advance Care Planning? (For example, a Health Directive, POLST, or a discussion with a medical provider or your loved ones about your wishes): No, advance care planning information given to patient to review.  Patient declined advance care planning discussion at this time.    Social History     Tobacco Use     Smoking status: Former     Packs/day: 0.20     Types: Cigarettes     Quit date: 2014     Years since quittin.6     Smokeless tobacco: Never   Substance Use Topics     Alcohol use: No     Alcohol/week: 0.0 standard drinks     Comment: Quit      If you drink alcohol do you typically have >3 drinks per day or >7 drinks per week? No    Alcohol Use 2022   Prescreen: >3 drinks/day or >7 drinks/week? Not Applicable   Prescreen: >3 drinks/day or >7 drinks/week? -       Last PSA:    PSA   Date Value Ref Range Status   2011 0.71 0 - 4 ug/L Final     Prostate Specific Antigen Screen   Date Value Ref Range Status   2022 1.06 0.00 - 4.00 ug/L Final       Reviewed orders with patient. Reviewed health maintenance and updated orders accordingly - Yes  BP Readings from Last 3 Encounters:   22 128/82   22 103/82   22 124/80    Wt Readings from Last 3 Encounters:   22 90 kg (198 lb 6.4 oz)   22 88.5 kg (195 lb)   22 90.7 kg (200 lb)                  Patient Active Problem List   Diagnosis     Hyperlipidemia LDL goal <100     Family history of diabetes mellitus     Overweight     Type 2 diabetes mellitus with hyperglycemia (H)     Personal history of tobacco use     Vaccination refused by patient     Past Surgical History:   Procedure Laterality Date     none         Social History     Tobacco Use     Smoking status: Former     Packs/day: 0.20     Types: Cigarettes     Quit date: 2014     Years since quittin.6     Smokeless tobacco: Never   Substance Use Topics     Alcohol use: No     Alcohol/week: 0.0 standard drinks     Comment: Quit      Family History   Problem Relation Age of Onset     Coronary Artery Disease Mother      Hypertension Father      C.A.D. Father      No Known Problems Sister      No Known Problems Brother      No Known Problems Brother      Diabetes Maternal Grandmother      Unknown/Adopted Maternal Grandfather      Diabetes Paternal Grandmother      Unknown/Adopted Paternal Grandfather      Diabetes Paternal Uncle      No Known Problems Son          Current Outpatient Medications   Medication Sig Dispense Refill     dulaglutide (TRULICITY) 0.75 MG/0.5ML pen Inject 0.75 mg Subcutaneous every 7 days 2 mL 0     blood glucose (NO BRAND SPECIFIED) test strip Use to test blood sugar 2 times daily or as directed. 100 strip 3     EPINEPHrine 0.3 MG/0.3ML injection Inject 0.3 mLs (0.3 mg) into the muscle once as needed for  "anaphylaxis (Patient not taking: Reported on 6/25/2019) 0.3 mL 1     Allergies   Allergen Reactions     Bees      Glimepiride      Causes headaches     Linagliptin      Lips swelling and blisters reported on 3/8/21     Recent Labs   Lab Test 11/22/22  1022 06/03/22  1244 01/24/22  1156 01/24/22  1156 05/03/19  1434 05/23/17  1639 07/01/16  1513 11/09/15  1435   A1C 10.3*  --   --  9.6*  --  9.0*   < > 7.2*   *  --   --  101*  --  145*   < > 164*   HDL 33*  --   --  31*  --  32*   < >  --    TRIG 199*  --   --  368*  --  252*   < >  --    ALT 38 33  --  36 50 52  --  52   CR 0.95 0.84   < > 0.89 0.91 0.81  --  0.89   GFRESTIMATED >90 >90   < > >90 >90 >90  Non African American GFR Calc    --  >90  Non  GFR Calc     GFRESTBLACK  --   --   --   --  >90 >90  African American GFR Calc    --  >90   GFR Calc     POTASSIUM 4.0 3.8   < > 4.3 3.9 3.9  --  3.6   TSH  --   --   --   --  0.88  --   --  1.02    < > = values in this interval not displayed.        Reviewed and updated as needed this visit by clinical staff   Tobacco  Allergies  Meds  Problems  Med Hx  Surg Hx  Fam Hx  Soc   Hx        Reviewed and updated as needed this visit by Provider   Tobacco  Allergies  Meds  Problems  Med Hx  Surg Hx  Fam Hx  Soc   Hx           Review of Systems   Genitourinary: Negative for impotence and penile discharge.   Musculoskeletal: Positive for myalgias.     Please see subjective otherwise the complete review of system was negative     OBJECTIVE:   /82   Pulse 78   Temp 98  F (36.7  C)   Resp 18   Ht 1.753 m (5' 9\")   Wt 90 kg (198 lb 6.4 oz)   SpO2 99%   BMI 29.30 kg/m  `    Physical Exam   GENERAL: healthy, alert and no distress  EYES: Eyes grossly normal to inspection, PERRL and conjunctivae and sclerae normal.  No nystagmus.  All 4 visual fields are intact  HENT: Ear canals and TM's normal.  Nares are non-congested. Oropharynx is pink and moist. No tender with " palpation to the sinuses.  NECK: no adenopathy, supple, no lymphadenopathy or thyromegaly.  No tender with palpation to the cervical spine or its paraspinous muscle.  RESP: lungs clear to auscultation - no rales, rhonchi or wheezes  CV: regular rate and rhythm, no murmur.  ABDOMEN: soft, nondistended, nontender, no palpable masses or organomegaly with normal bowel sounds.  MS: no gross musculoskeletal defects noted, no edema.  Walk with no limping, normal gait.  All 4 extremities are equally in strength. Ankle, knees, hips, shoulders, elbows and wrists exams normal.  Normal fine motor skills on fingers.  Back is straight, no lordosis or scoliosis.  No tender with palpation.  SKIN: no suspicious lesions or rashes  NEURO: Normal strength and tone, mentation intact and speech normal.  Cranial nerves II through XII intact, DTR +2 throughout, no focal neurological deficit.  PSYCH: mentation appears normal, affect normal/bright.  Thoughts intact, no hallucination.  No suicidal or homicidal ideation.  LYMPH: no cervical, supraclavicular or axillary adenopathy.   (male): Offered but declined.  He has no concern about it  Diabetic foot exam: normal DP and PT pulses, no trophic changes or ulcerative lesions, normal sensory exam and normal monofilament exam    Diagnostic Test Results:  Labs reviewed in Epic  Results for orders placed or performed in visit on 11/22/22   HEMOGLOBIN A1C     Status: Abnormal   Result Value Ref Range    Hemoglobin A1C 10.3 (H) 0.0 - 5.6 %   Lipid panel reflex to direct LDL Fasting     Status: Abnormal   Result Value Ref Range    Cholesterol 182 <200 mg/dL    Triglycerides 199 (H) <150 mg/dL    Direct Measure HDL 33 (L) >=40 mg/dL    LDL Cholesterol Calculated 109 (H) <=100 mg/dL    Non HDL Cholesterol 149 (H) <130 mg/dL    Patient Fasting > 8hrs? Yes     Narrative    Cholesterol  Desirable:  <200 mg/dL    Triglycerides  Normal:  Less than 150 mg/dL  Borderline High:  150-199 mg/dL  High:  200-499  mg/dL  Very High:  Greater than or equal to 500 mg/dL    Direct Measure HDL  Female:  Greater than or equal to 50 mg/dL   Male:  Greater than or equal to 40 mg/dL    LDL Cholesterol  Desirable:  <100mg/dL  Above Desirable:  100-129 mg/dL   Borderline High:  130-159 mg/dL   High:  160-189 mg/dL   Very High:  >= 190 mg/dL    Non HDL Cholesterol  Desirable:  130 mg/dL  Above Desirable:  130-159 mg/dL  Borderline High:  160-189 mg/dL  High:  190-219 mg/dL  Very High:  Greater than or equal to 220 mg/dL   Comprehensive metabolic panel (BMP + Alb, Alk Phos, ALT, AST, Total. Bili, TP)     Status: Abnormal   Result Value Ref Range    Sodium 141 133 - 144 mmol/L    Potassium 4.0 3.4 - 5.3 mmol/L    Chloride 108 94 - 109 mmol/L    Carbon Dioxide (CO2) 29 20 - 32 mmol/L    Anion Gap 4 3 - 14 mmol/L    Urea Nitrogen 18 7 - 30 mg/dL    Creatinine 0.95 0.66 - 1.25 mg/dL    Calcium 8.5 8.5 - 10.1 mg/dL    Glucose 181 (H) 70 - 99 mg/dL    Alkaline Phosphatase 82 40 - 150 U/L    AST 12 0 - 45 U/L    ALT 38 0 - 70 U/L    Protein Total 7.7 6.8 - 8.8 g/dL    Albumin 4.4 3.4 - 5.0 g/dL    Bilirubin Total 0.4 0.2 - 1.3 mg/dL    GFR Estimate >90 >60 mL/min/1.73m2   PSA, screen     Status: Normal   Result Value Ref Range    Prostate Specific Antigen Screen 1.06 0.00 - 4.00 ug/L    Narrative    Assay Method:  Chemiluminescence using Siemens   Vista analyzer.       ASSESSMENT/PLAN:   (Z00.00) Routine general medical examination at a health care facility  (primary encounter diagnosis)  Comment: Overall Darren is doing well.  He declined all vaccination including hep B, COVID, Pneumovax, shingle and influenza.  Discussed about safety issue, healthy diet, exercising, weight management, good sleeping hygiene, advanced directive care, falling prevention, and depression prevention. Recommended daily exercise for at least 30 minutes.  Emphasized on healthy diet with adequate fluid intake and resting. Feels safe.  Follow in 1 year, earlier as needed.   Labs as ordered below.      Discussed with him about options for colon cancer screening which include colonoscopy, Cologuard or FIT test.  Pros and cons of each option discussed.  He colonoscopy and therefore it was ordered.     Discussed with him about the pros and cons of prostate screening cancer with PSA.  Also educated about the potential false positive which may require unnecessary work-up.  He was informed of negative/normal PSA leve does not rule out prostate cancer completely although it is very unlikely.  He understands and is willing to take the risk.      All of his questions were answered.    Plan: Lipid panel reflex to direct LDL Fasting,         Comprehensive metabolic panel (BMP + Alb, Alk         Phos, ALT, AST, Total. Bili, TP), PSA, screen            (E11.65) Type 2 diabetes mellitus with hyperglycemia, without long-term current use of insulin (H)  Comment: Today's Hgb A1c is 10.3.  Been taking the Trulicity at 0.5 mg weekly; did not take the Amaryl nor the metformin as prescribed.  Also did not take the Lipitor.  Discussed about the potential complications associated with uncontrolled diabetes and informed him that the goal for his blood sugar is to be less than 120 fasting and hemoglobin A1c to be less than 7.0.  Encouraged to start monitoring his blood sugar 1-2 times a day and let me know if it is persistently above 140 fasting.  Discussed with about treatment options.  He would like to continue with Trulicity.  We will taper off the Trulicity slowly to 3 mg weekly as tolerated.  Also will start restart him on a low-dose metformin and Lipitor.  The Lipitor 80 is for cardioprotective and prevent stroke.  Side effect discussed and no contraindication identified.  Emphasized on healthy diet, exercising and weight management as discussed above.  Instructed not to skip meals.  Encouraged to get his eye exam done as soon as possible and yearly.  Foot care daily is recommended.  Recommended  vaccination include pneumococcal, COVID, influenza and shingle but he declined.  Recheck hemoglobin A1c in 3 months.  He declined the insulin.    Plan: Adult Eye  Referral, HEMOGLOBIN A1C,         dulaglutide (TRULICITY) 0.75 MG/0.5ML pen,         atorvastatin (LIPITOR) 10 MG tablet, metFORMIN         (GLUCOPHAGE XR) 500 MG 24 hr tablet            (E78.5) Hyperlipidemia LDL goal <100  Comment: Has not been taking the Lipitor.  His cholesterol level is not too bad today.  Statin is needed for cardioprotection and prevent stroke from diabetes.  No history of liver disease and his liver enzymes today was normal.  Denied excessive alcohol intake.  Again, emphasized on healthy lifestyle modification as discussed above.  Restart the Lipitor at 10 mg daily.  Potential side effect discussed.    Plan: Lipid panel reflex to direct LDL Fasting,         atorvastatin (LIPITOR) 10 MG tablet            (Z12.11) Screen for colon cancer  Comment: Please see #1 above for further details.  Plan: Colonoscopy Screening  Referral            (Z87.891) Personal history of tobacco use  Comment: Quit smoking since 2014 encouraged him to keep a good work.  He was not a heavy smoker.      (E66.3) Overweight  Comment: Today's BMI was 29.  Comorbidity include uncontrolled diabetes and borderline to high cholesterol.  Exercising and healthy/portion diet discussed and encouraged.  Discussed about the goal for his weight and his BMI.  No history of thyroid problem      Patient has been advised of split billing requirements and indicates understanding: Yes      COUNSELING:   Reviewed preventive health counseling, as reflected in patient instructions       Regular exercise       Healthy diet/nutrition       Vision screening       Hearing screening       Immunizations    Declined: Covid-19, Influenza, Pneumococcal and Zoster due to Conscientious objector               Aspirin prophylaxis        Alcohol Use        Colorectal cancer  "screening       Prostate cancer screening       Advance Care Planning      BMI:   Estimated body mass index is 29.3 kg/m  as calculated from the following:    Height as of this encounter: 1.753 m (5' 9\").    Weight as of this encounter: 90 kg (198 lb 6.4 oz).   Weight management plan: Discussed healthy diet and exercise guidelines      He reports that he quit smoking about 8 years ago. His smoking use included cigarettes. He smoked an average of .2 packs per day. He has never used smokeless tobacco.        Wilfrido Cabello Mai, MD  [facility]  "

## 2022-11-28 ENCOUNTER — TELEPHONE (OUTPATIENT)
Dept: GASTROENTEROLOGY | Facility: CLINIC | Age: 52
End: 2022-11-28

## 2022-11-28 ENCOUNTER — MYC MEDICAL ADVICE (OUTPATIENT)
Dept: GASTROENTEROLOGY | Facility: CLINIC | Age: 52
End: 2022-11-28

## 2022-11-28 NOTE — TELEPHONE ENCOUNTER
Screening Questions  BLUE  KIND OF PREP RED  LOCATION [review exclusion criteria] GREEN  SEDATION TYPE        Y Are you active on mychart?       Wilfrido Huntley Ordering/Referring Provider?        TAMIKO RAMIREZ What type of coverage do you have?      N Have you had a positive covid test in the last 14 days?     29.30 1. BMI  [BMI 40+ - review exclusion criteria]    Y  2. Are you able to give consent for your medical care? [IF NO,RN REVIEW]        N  3. Are you taking any prescription pain medications on a routine schedule?      N  3a. EXTENDED PREP What kind of prescription?     N 4. Do you have any chemical dependencies such as alcohol, street drugs, or methadone?    N 5. Do you have any history of post-traumatic stress syndrome, severe anxiety or history of psychosis?      **If yes 3- 5 , please schedule with MAC sedation.**          IF YES TO ANY 6 - 10 - HOSPITAL SETTING ONLY.     N 6.   Do you need assistance transferring?     N 7.   Have you had a heart or lung transplant?    N 8.   Are you currently on dialysis?   N 9.   Do you use daily home oxygen?   N 10. Do you take nitroglycerin?   10a. N If yes, how often?     11. [FEMALES]  N Are you currently pregnant?    11a. N If yes, how many weeks? [ Greater than 12 weeks, OR NEEDED]    N 12. Do you have Pulmonary Hypertension? *NEED PAC APPT AT [facility]*     N 13. [review exclusion criteria]  Do you have any implantable devices in your body (pacemaker, defib, LVAD)?    N 14. In the past 6 months, have you had any heart related issues including cardiomyopathy or heart attack?     14a. N If yes, did it require cardiac stenting if so when?     N 15. Have you had a stroke or Transient ischemic attack (TIA - aka  mini stroke ) within 6 months?      N 16. Do you have mod to severe Obstructive Sleep Apnea?  [Hospital only - Ok at [place]]    N 17. Do you have SEVERE AND UNCONTROLLED asthma? *NEED PAC APPT AT [facility]*     N 18. Are you currently taking any blood thinners?     " 18a. If yes, inform patient to \"follow up w/ ordering provider for bridging instructions.\"    N 19. Do you take the medication Phentermine?    19a. If yes, \"Hold for 7 days before procedure.  Please consult your prescribing provider if you have questions about holding this medication.\"     N  20. Do you have chronic kidney disease?      Y TYPE 2  21. Do you have a diagnosis of diabetes?     N  22. On a regular basis do you go 3-5 days between bowel movements?      23. Preferred LOCAL Pharmacy for Pre Prescription    [ LIST ONLY ONE PHARMACY]          [id number] - [address]        - CLOSING REMINDERS -    Informed patient they will need an adult    Cannot take any type of public or medical transportation alone    Conscious Sedation- Needs  for 6 hours after the procedure       MAC/General-Needs  for 24 hours after procedure    Pre-Procedure Covid test to be completed [[facility] PCR Testing Required]    Confirmed Nurse will call to complete assessment       - SCHEDULING DETAILS -     long  Surgeon    1/10/23  Date of Procedure  Lower Endoscopy [Colonoscopy]  Type of Procedure Scheduled  [facility]-If you answer yes to questions #8, #20, #21Which Colonoscopy Prep was Sent?     mac Sedation Type     n PAC / Pre-op Required         Additional comments:            "

## 2022-12-02 NOTE — TELEPHONE ENCOUNTER
Medication Question or Refill    Contacts       Type Contact Phone/Fax    12/02/2022 08:20 AM CST Phone (Incoming) Darren Tenorio (Self) [phone] (M)          What medication are you calling about (include dose and sig)? Tristone?? - Patient spoke with provider regarding upping the dosage     Controlled Substance Agreement on file:   CSA -- Patient Level:    CSA: None found at the patient level.       Who prescribed the medication?:     Do you need a refill? Yes - At a higher DOSE    When did you use the medication last? Last week 11/21/2022    Patient offered an appointment? No    Do you have any questions or concerns?  Yes     Preferred Pharmacy:    [id number] - [address] [address]  Phone: [phone] Fax: [phone]      Could we send this information to you in [facility] or would you prefer to receive a phone call?:   No preference   Okay to leave a detailed message?: Yes at Cell number on file:    Telephone Information:   Mobile [phone]

## 2022-12-10 PROBLEM — E66.3 OVERWEIGHT: Status: ACTIVE | Noted: 2018-08-14

## 2022-12-10 RX ORDER — DULAGLUTIDE 3 MG/.5ML
3 INJECTION, SOLUTION SUBCUTANEOUS
Qty: 6 ML | Refills: 1 | Status: SHIPPED | OUTPATIENT
Start: 2022-12-10

## 2022-12-10 RX ORDER — METFORMIN HCL 500 MG
500 TABLET, EXTENDED RELEASE 24 HR ORAL 2 TIMES DAILY WITH MEALS
Qty: 180 TABLET | Refills: 1 | Status: SHIPPED | OUTPATIENT
Start: 2022-12-10 | End: 2023-01-05

## 2022-12-10 RX ORDER — ATORVASTATIN CALCIUM 10 MG/1
10 TABLET, FILM COATED ORAL DAILY
Qty: 90 TABLET | Refills: 3 | Status: SHIPPED | OUTPATIENT
Start: 2022-12-10

## 2022-12-13 ENCOUNTER — MYC REFILL (OUTPATIENT)
Dept: FAMILY MEDICINE | Facility: CLINIC | Age: 52
End: 2022-12-13

## 2022-12-13 DIAGNOSIS — E11.65 TYPE 2 DIABETES MELLITUS WITH HYPERGLYCEMIA, WITHOUT LONG-TERM CURRENT USE OF INSULIN (H): ICD-10-CM

## 2022-12-13 DIAGNOSIS — E11.65 TYPE 2 DIABETES MELLITUS WITH HYPERGLYCEMIA, WITHOUT LONG-TERM CURRENT USE OF INSULIN (H): Primary | ICD-10-CM

## 2022-12-15 RX ORDER — DULAGLUTIDE 0.75 MG/.5ML
0.75 INJECTION, SOLUTION SUBCUTANEOUS
Qty: 2 ML | Refills: 0 | OUTPATIENT
Start: 2022-12-15

## 2022-12-15 RX ORDER — DULAGLUTIDE 1.5 MG/.5ML
INJECTION, SOLUTION SUBCUTANEOUS
Qty: 6 ML | Refills: 0 | Status: SHIPPED | OUTPATIENT
Start: 2022-12-15

## 2022-12-15 NOTE — TELEPHONE ENCOUNTER
Pending Prescriptions:                       Disp   Refills    TRULICITY 1.5 MG/0.5ML pen [Pharmacy Med N*6 mL   0        Sig: INJECT 1.5MG EVERY 7 DAYS    Routing refill request to provider for review/approval because:  Different dosage/concentration being requested than what is on active med list.     Lita Red RN on 12/15/2022 at 11:33 AM

## 2023-01-09 ENCOUNTER — ANESTHESIA EVENT (OUTPATIENT)
Dept: GASTROENTEROLOGY | Facility: CLINIC | Age: 53
End: 2023-01-09
Payer: COMMERCIAL

## 2023-01-09 ASSESSMENT — LIFESTYLE VARIABLES: TOBACCO_USE: 1

## 2023-01-09 NOTE — H&P
[facility] Anesthesia Pre-op History and Physical    Darren Tenorio MRN# [id number]   Age: 52 year old YOB: 1970      Date of Surgery: 1/10/2023 Location [facility]      Date of Exam 1/10/2023 Facility (Same day)       Home clinic: [facility]  Primary care provider: [facility] Maury RiceMatador         Chief Complaint and/or Reason for Procedure:   No chief complaint on file.  Screening colon.         Active problem list:     Patient Active Problem List    Diagnosis Date Noted     Personal history of tobacco use 01/29/2022     Priority: Medium     Vaccination refused by patient 01/29/2022     Priority: Medium     Type 2 diabetes mellitus with hyperglycemia (H) 02/15/2019     Priority: Medium     Overweight 08/14/2018     Priority: Medium     Family history of diabetes mellitus 05/23/2017     Priority: Medium     aunts and uncle and not immediate family       Hyperlipidemia LDL goal <100 11/10/2015     Priority: Medium            Medications (include herbals and vitamins):   Any Plavix use in the last 7 days? No     No current facility-administered medications for this encounter.     Current Outpatient Medications   Medication Sig     atorvastatin (LIPITOR) 10 MG tablet Take 1 tablet (10 mg) by mouth daily     bisacodyl (DULCOLAX) 5 MG EC tablet Take 2 tablets at 3 pm the day before your procedure. If your procedure is before 11 am, take 2 additional tablets at 11 pm. If your procedure is after 11 am, take 2 additional tablets at 6 am. For additional instructions refer to your colonoscopy prep instructions.     blood glucose (NO BRAND SPECIFIED) test strip Use to test blood sugar 2 times daily or as directed.     dulaglutide (TRULICITY) 0.75 MG/0.5ML pen Inject 0.75 mg Subcutaneous every 7 days     Dulaglutide (TRULICITY) 3 MG/0.5ML SOPN Inject 3 mg Subcutaneous every 7 days Start this when you are done with the 0.75 mg dose      EPINEPHrine 0.3 MG/0.3ML injection Inject 0.3 mLs (0.3 mg) into the muscle once as needed for anaphylaxis (Patient not taking: Reported on 6/25/2019)     polyethylene glycol (GOLYTELY) 236 g suspension The night before the exam at 6 pm drink an 8-ounce glass every 15 minutes until the jug is half empty. If you arrive before 11 AM: Drink the other half of the Golytely jug at 11 PM night before procedure. If you arrive after 11 AM: Drink the other half of the Golytely jug at 6 AM day of procedure. For additional instructions refer to your colonoscopy prep instructions.     TRULICITY 1.5 MG/0.5ML pen INJECT 1.5MG EVERY 7 DAYS             Allergies:      Allergies   Allergen Reactions     Bees      Glimepiride      Causes headaches     Linagliptin      Lips swelling and blisters reported on 3/8/21     Allergy to Latex? No  Allergy to tape?   No  Intolerances:             Physical Exam:   All vitals have been reviewed  No data found.  No intake/output data recorded.  Lungs:   No increased work of breathing, good air exchange, clear to auscultation bilaterally, no crackles or wheezing     Cardiovascular:   Normal apical impulse, regular rate and rhythm, normal S1 and S2, no S3 or S4, and no murmur noted             Lab / Radiology Results:            Anesthetic risk and/or ASA classification:       Jl Carrillo MD

## 2023-01-10 ENCOUNTER — ANESTHESIA (OUTPATIENT)
Dept: GASTROENTEROLOGY | Facility: CLINIC | Age: 53
End: 2023-01-10
Payer: COMMERCIAL

## 2023-01-10 ENCOUNTER — HOSPITAL ENCOUNTER (OUTPATIENT)
Facility: CLINIC | Age: 53
Discharge: HOME | End: 2023-01-10
Attending: INTERNAL MEDICINE | Admitting: INTERNAL MEDICINE
Payer: COMMERCIAL

## 2023-01-10 VITALS
BODY MASS INDEX: 28.35 KG/M2 | HEART RATE: 87 BPM | RESPIRATION RATE: 16 BRPM | SYSTOLIC BLOOD PRESSURE: 124 MMHG | TEMPERATURE: 97.8 F | DIASTOLIC BLOOD PRESSURE: 99 MMHG | WEIGHT: 192 LBS | OXYGEN SATURATION: 97 %

## 2023-01-10 LAB
COLONOSCOPY: NORMAL
GLUCOSE BLDC GLUCOMTR-MCNC: 177 MG/DL (ref 70–99)

## 2023-01-10 PROCEDURE — 45378 DIAGNOSTIC COLONOSCOPY: CPT | Performed by: INTERNAL MEDICINE

## 2023-01-10 PROCEDURE — 250N000011 HC RX IP 250 OP 636: Performed by: NURSE ANESTHETIST, CERTIFIED REGISTERED

## 2023-01-10 PROCEDURE — 82962 GLUCOSE BLOOD TEST: CPT

## 2023-01-10 PROCEDURE — 250N000009 HC RX 250: Performed by: NURSE ANESTHETIST, CERTIFIED REGISTERED

## 2023-01-10 PROCEDURE — 370N000017 HC ANESTHESIA TECHNICAL FEE, PER MIN: Performed by: INTERNAL MEDICINE

## 2023-01-10 PROCEDURE — 258N000003 HC RX IP 258 OP 636: Performed by: NURSE ANESTHETIST, CERTIFIED REGISTERED

## 2023-01-10 PROCEDURE — G0121 COLON CA SCRN NOT HI RSK IND: HCPCS | Performed by: INTERNAL MEDICINE

## 2023-01-10 RX ORDER — LIDOCAINE HYDROCHLORIDE 20 MG/ML
INJECTION, SOLUTION INFILTRATION; PERINEURAL PRN
Status: DISCONTINUED | OUTPATIENT
Start: 2023-01-10 | End: 2023-01-10

## 2023-01-10 RX ORDER — PROPOFOL 10 MG/ML
INJECTION, EMULSION INTRAVENOUS PRN
Status: DISCONTINUED | OUTPATIENT
Start: 2023-01-10 | End: 2023-01-10

## 2023-01-10 RX ORDER — ONDANSETRON 4 MG/1
4 TABLET, ORALLY DISINTEGRATING ORAL EVERY 30 MIN PRN
Status: CANCELLED | OUTPATIENT
Start: 2023-01-10

## 2023-01-10 RX ORDER — SODIUM CHLORIDE, SODIUM LACTATE, POTASSIUM CHLORIDE, CALCIUM CHLORIDE 600; 310; 30; 20 MG/100ML; MG/100ML; MG/100ML; MG/100ML
INJECTION, SOLUTION INTRAVENOUS CONTINUOUS
Status: CANCELLED | OUTPATIENT
Start: 2023-01-10

## 2023-01-10 RX ORDER — ONDANSETRON 2 MG/ML
4 INJECTION INTRAMUSCULAR; INTRAVENOUS EVERY 30 MIN PRN
Status: CANCELLED | OUTPATIENT
Start: 2023-01-10

## 2023-01-10 RX ORDER — SODIUM CHLORIDE, SODIUM LACTATE, POTASSIUM CHLORIDE, CALCIUM CHLORIDE 600; 310; 30; 20 MG/100ML; MG/100ML; MG/100ML; MG/100ML
INJECTION, SOLUTION INTRAVENOUS CONTINUOUS
Status: DISCONTINUED | OUTPATIENT
Start: 2023-01-10 | End: 2023-01-10 | Stop reason: HOSPADM

## 2023-01-10 RX ORDER — PROPOFOL 10 MG/ML
INJECTION, EMULSION INTRAVENOUS CONTINUOUS PRN
Status: DISCONTINUED | OUTPATIENT
Start: 2023-01-10 | End: 2023-01-10

## 2023-01-10 RX ADMIN — PROPOFOL 100 MG: 10 INJECTION, EMULSION INTRAVENOUS at 07:31

## 2023-01-10 RX ADMIN — SODIUM CHLORIDE, POTASSIUM CHLORIDE, SODIUM LACTATE AND CALCIUM CHLORIDE: 600; 310; 30; 20 INJECTION, SOLUTION INTRAVENOUS at 07:10

## 2023-01-10 RX ADMIN — PROPOFOL 150 MCG/KG/MIN: 10 INJECTION, EMULSION INTRAVENOUS at 07:31

## 2023-01-10 RX ADMIN — LIDOCAINE HYDROCHLORIDE 50 MG: 20 INJECTION, SOLUTION INFILTRATION; PERINEURAL at 07:30

## 2023-01-10 ASSESSMENT — ACTIVITIES OF DAILY LIVING (ADL): ADLS_ACUITY_SCORE: 35

## 2023-01-10 NOTE — ANESTHESIA POSTPROCEDURE EVALUATION
Patient: Darren Tenorio    Procedure: Procedure(s):  COLONOSCOPY       Anesthesia Type:  MAC    Note:  Disposition: Outpatient   Postop Pain Control: Uneventful            Sign Out: Well controlled pain   PONV: No   Neuro/Psych: Uneventful            Sign Out: Acceptable/Baseline neuro status   Airway/Respiratory: Uneventful            Sign Out: Acceptable/Baseline resp. status   CV/Hemodynamics: Uneventful            Sign Out: Acceptable CV status   Other NRE: NONE   DID A NON-ROUTINE EVENT OCCUR? No    Event details/Postop Comments:  Pt was happy with anesthesia care.  No complications.  I will follow up with the pt if needed.           Last vitals:  Vitals Value Taken Time   /99 01/10/23 0820   Temp     Pulse 87 01/10/23 0820   Resp     SpO2 97 % 01/10/23 0821   Vitals shown include unvalidated device data.    Electronically Signed By: JUANITO Medina CRNA  January 10, 2023  8:35 AM

## 2023-01-10 NOTE — DISCHARGE INSTRUCTIONS
[facility]    Home Care Following Endoscopy          Activity:  You have just undergone an endoscopic procedure usually performed with conscious sedation.  Do not work or operate machinery (including a car) for at least 12 hours.    I encourage you to walk and attempt to pass this air as soon as possible.    Diet:  Return to the diet you were on before your procedure but eat lightly for the first 12-24 hours.  Drink plenty of water.  Resume any regular medications unless otherwise advised by your physician.  Please begin any new medication prescribed as a result of your procedure as directed by your physician.   If you had any biopsy or polyp removed please refrain from aspirin or aspirin products for 2 days.  If on Coumadin please restart as instructed by your physician.   Pain:  You may take Tylenol as needed for pain.  Expected Recovery:  You can expect some mild abdominal fullness and/or discomfort due to the air used to inflate your intestinal tract. It is also normal to have a mild sore throat after upper endoscopy.    Call Your Physician if You Have:  After Colonoscopy:  Worsening persisting abdominal pain which is worse with activity.  Fevers (>101 degrees F), chills or shakes.  Passage of continued blood with bowel movements.   Any questions or concerns about your recovery, please call [phone] or after hours [phone] Nurse Advice Line.    Follow-up Care:  You did have polyps/biopsy tissue sample(s) removed.  The polyps/biopsy tissue sample(s) will be sent to pathology.  You should receive letter in your My Chart from Dr Carrillo with your results within 1-2 weeks. If you do not participate in My Chart a physical letter will come in the mail in 2-3 weeks.  Please call if you have not received a notification of your results.
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
## 2023-01-10 NOTE — ANESTHESIA CARE TRANSFER NOTE
Patient: Darren Tenorio    Procedure: Procedure(s):  COLONOSCOPY       Diagnosis: Screen for colon cancer [Z12.11]  Diagnosis Additional Information: No value filed.    Anesthesia Type:   MAC     Note:    Oropharynx: oropharynx clear of all foreign objects and spontaneously breathing  Level of Consciousness: drowsy  Oxygen Supplementation: room air    Independent Airway: airway patency satisfactory and stable  Dentition: dentition unchanged  Vital Signs Stable: post-procedure vital signs reviewed and stable  Report to RN Given: handoff report given  Patient transferred to: Phase II    Handoff Report: Identifed the Patient, Identified the Reponsible Provider, Reviewed the pertinent medical history, Discussed the surgical course, Reviewed Intra-OP anesthesia mangement and issues during anesthesia, Set expectations for post-procedure period and Allowed opportunity for questions and acknowledgement of understanding      Vitals:  Vitals Value Taken Time   BP     Temp     Pulse     Resp     SpO2         Electronically Signed By: JUANITO Medina CRNA  January 10, 2023  7:56 AM

## 2023-01-16 ENCOUNTER — HOSPITAL ENCOUNTER (EMERGENCY)
Facility: CLINIC | Age: 53
Discharge: HOME | End: 2023-01-16
Attending: EMERGENCY MEDICINE | Admitting: EMERGENCY MEDICINE
Payer: COMMERCIAL

## 2023-01-16 ENCOUNTER — APPOINTMENT (OUTPATIENT)
Dept: GENERAL RADIOLOGY | Facility: CLINIC | Age: 53
End: 2023-01-16
Attending: EMERGENCY MEDICINE
Payer: COMMERCIAL

## 2023-01-16 VITALS
TEMPERATURE: 98.6 F | RESPIRATION RATE: 16 BRPM | SYSTOLIC BLOOD PRESSURE: 128 MMHG | WEIGHT: 202 LBS | HEART RATE: 78 BPM | OXYGEN SATURATION: 95 % | BODY MASS INDEX: 28.28 KG/M2 | HEIGHT: 71 IN | DIASTOLIC BLOOD PRESSURE: 79 MMHG

## 2023-01-16 DIAGNOSIS — R07.89 ATYPICAL CHEST PAIN: ICD-10-CM

## 2023-01-16 LAB
ANION GAP SERPL CALCULATED.3IONS-SCNC: 6 MMOL/L (ref 3–14)
BASOPHILS # BLD AUTO: 0.1 10E3/UL (ref 0–0.2)
BASOPHILS NFR BLD AUTO: 2 %
BUN SERPL-MCNC: 12 MG/DL (ref 7–30)
CALCIUM SERPL-MCNC: 8.8 MG/DL (ref 8.5–10.1)
CHLORIDE BLD-SCNC: 105 MMOL/L (ref 94–109)
CO2 SERPL-SCNC: 27 MMOL/L (ref 20–32)
CREAT SERPL-MCNC: 0.85 MG/DL (ref 0.66–1.25)
EOSINOPHIL # BLD AUTO: 0.4 10E3/UL (ref 0–0.7)
EOSINOPHIL NFR BLD AUTO: 6 %
ERYTHROCYTE [DISTWIDTH] IN BLOOD BY AUTOMATED COUNT: 12 % (ref 10–15)
GFR SERPL CREATININE-BSD FRML MDRD: >90 ML/MIN/1.73M2
GLUCOSE BLD-MCNC: 206 MG/DL (ref 70–99)
HCT VFR BLD AUTO: 40.9 % (ref 40–53)
HGB BLD-MCNC: 13.9 G/DL (ref 13.3–17.7)
IMM GRANULOCYTES # BLD: 0 10E3/UL
IMM GRANULOCYTES NFR BLD: 0 %
LYMPHOCYTES # BLD AUTO: 2.9 10E3/UL (ref 0.8–5.3)
LYMPHOCYTES NFR BLD AUTO: 40 %
MCH RBC QN AUTO: 28.7 PG (ref 26.5–33)
MCHC RBC AUTO-ENTMCNC: 34 G/DL (ref 31.5–36.5)
MCV RBC AUTO: 85 FL (ref 78–100)
MONOCYTES # BLD AUTO: 0.8 10E3/UL (ref 0–1.3)
MONOCYTES NFR BLD AUTO: 11 %
NEUTROPHILS # BLD AUTO: 3.1 10E3/UL (ref 1.6–8.3)
NEUTROPHILS NFR BLD AUTO: 41 %
NRBC # BLD AUTO: 0 10E3/UL
NRBC BLD AUTO-RTO: 0 /100
PLATELET # BLD AUTO: 236 10E3/UL (ref 150–450)
POTASSIUM BLD-SCNC: 3.6 MMOL/L (ref 3.4–5.3)
RBC # BLD AUTO: 4.84 10E6/UL (ref 4.4–5.9)
SODIUM SERPL-SCNC: 138 MMOL/L (ref 133–144)
TROPONIN I SERPL HS-MCNC: 3 NG/L
WBC # BLD AUTO: 7.4 10E3/UL (ref 4–11)

## 2023-01-16 PROCEDURE — 85025 COMPLETE CBC W/AUTO DIFF WBC: CPT | Performed by: EMERGENCY MEDICINE

## 2023-01-16 PROCEDURE — 93010 ELECTROCARDIOGRAM REPORT: CPT | Performed by: EMERGENCY MEDICINE

## 2023-01-16 PROCEDURE — 80048 BASIC METABOLIC PNL TOTAL CA: CPT | Performed by: EMERGENCY MEDICINE

## 2023-01-16 PROCEDURE — 93005 ELECTROCARDIOGRAM TRACING: CPT | Performed by: EMERGENCY MEDICINE

## 2023-01-16 PROCEDURE — 84484 ASSAY OF TROPONIN QUANT: CPT | Performed by: EMERGENCY MEDICINE

## 2023-01-16 PROCEDURE — 36415 COLL VENOUS BLD VENIPUNCTURE: CPT | Performed by: EMERGENCY MEDICINE

## 2023-01-16 PROCEDURE — 71046 X-RAY EXAM CHEST 2 VIEWS: CPT

## 2023-01-16 PROCEDURE — 99284 EMERGENCY DEPT VISIT MOD MDM: CPT | Mod: 25 | Performed by: EMERGENCY MEDICINE

## 2023-01-16 PROCEDURE — 99285 EMERGENCY DEPT VISIT HI MDM: CPT | Mod: 25 | Performed by: EMERGENCY MEDICINE

## 2023-01-16 ASSESSMENT — ACTIVITIES OF DAILY LIVING (ADL): ADLS_ACUITY_SCORE: 35
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
## 2023-01-16 NOTE — DISCHARGE INSTRUCTIONS
Your EKG, chest x-ray, and lab work did not show any acute worrisome findings such as a heart attack that explains your chest pain    You should follow-up with your primary provider and discuss a stress test.  You may need to do this on an outpatient basis, and can talk with Dr. Huntley about the treadmill stress test versus the medication stress test.  This will help to further evaluate your heart as a cause for your intermittent symptoms    If you have any worsening symptoms, do not hesitate to return to the emergency room for evaluation

## 2023-01-16 NOTE — ED TRIAGE NOTES
"Reports chest pain starting Friday and states he has not felt it since Saturday.  Bilateral arm \"pressure\" and left jaw pain. \"Right arm has felt like this for a couple weeks.\"     Triage Assessment     Row Name 01/16/23 0738       Triage Assessment (Adult)    Airway WDL WDL       Respiratory WDL    Respiratory WDL WDL       Skin Circulation/Temperature WDL    Skin Circulation/Temperature WDL WDL       Cardiac WDL    Cardiac WDL X;chest pain       Chest Pain Assessment    Chest Pain Location anterior chest, left    Chest Pain Radiation jaw;arm    Duration \"Chest pain since Friday but no pain since saturday\"       Peripheral/Neurovascular WDL    Peripheral Neurovascular WDL WDL       Cognitive/Neuro/Behavioral WDL    Cognitive/Neuro/Behavioral WDL WDL              "

## 2023-01-16 NOTE — Clinical Note
Darren Tenorio was seen and treated in our emergency department on 1/16/2023.         Sincerely,     [facility] Emergency Dept

## 2023-01-18 DIAGNOSIS — R07.9 CHEST PAIN, UNSPECIFIED TYPE: Primary | ICD-10-CM

## 2023-01-18 DIAGNOSIS — E78.5 HYPERLIPIDEMIA LDL GOAL <100: ICD-10-CM

## 2023-01-18 DIAGNOSIS — E11.65 TYPE 2 DIABETES MELLITUS WITH HYPERGLYCEMIA, WITH LONG-TERM CURRENT USE OF INSULIN (H): ICD-10-CM

## 2023-01-18 DIAGNOSIS — Z79.4 TYPE 2 DIABETES MELLITUS WITH HYPERGLYCEMIA, WITH LONG-TERM CURRENT USE OF INSULIN (H): ICD-10-CM

## 2023-01-18 NOTE — TELEPHONE ENCOUNTER
Patient is not taking the metformin.  Patient stated he doesn't want to take the metformin it tears up his stomach and makes him sick.      He is taking 1.5mg of trulicity and the Lipitor.  Patient stated when he went to get the 3 mg they don't have that they only have the 1.5 or .75. and he looked everywhere and no one has that dose.     Patient feels the 3mg would be to much he states he is doing just fine on the Lipitor and the 1.5mg. when he was in the er those numbers were bad because he was eating horrible.     He needs more test strips.

## 2023-01-18 NOTE — TELEPHONE ENCOUNTER
"----- Message from Ursula Lion DO sent at 1/16/2023  9:16 AM CST -----  Regarding: Stres test  Noe Anna,    I saw Darren in the ED today for intermittent chest pain. His work up was fine, but due to his history, probably needs a stress test.    He said \"the treadmill will kill me\", so not sure if he would qualify to do a Lexiscan? I'll leave the decision to you.    Thanks!  Ursula"

## 2023-01-18 NOTE — TELEPHONE ENCOUNTER
Please help to set up for nuclear cardiac stress test -ordered  Also have him to recheck the hgb a1c in 2 months.  Please remind him that I recommend to increase his Trulicity to 3 mg dose.  Also be sure to take the metformin and Lipitor.  Thank you

## 2023-01-18 NOTE — TELEPHONE ENCOUNTER
Ok. Will continue with the 1.5 mg, please check the hemoglobin A1c in couple months and go from there.  Thank you

## 2023-04-11 ENCOUNTER — OFFICE VISIT (OUTPATIENT)
Dept: FAMILY MEDICINE | Facility: CLINIC | Age: 53
End: 2023-04-11
Payer: COMMERCIAL

## 2023-04-11 VITALS
WEIGHT: 195.25 LBS | RESPIRATION RATE: 20 BRPM | BODY MASS INDEX: 28.92 KG/M2 | HEART RATE: 100 BPM | DIASTOLIC BLOOD PRESSURE: 70 MMHG | TEMPERATURE: 98 F | OXYGEN SATURATION: 97 % | SYSTOLIC BLOOD PRESSURE: 122 MMHG | HEIGHT: 69 IN

## 2023-04-11 DIAGNOSIS — M65.30 TRIGGER FINGER, ACQUIRED: Primary | ICD-10-CM

## 2023-04-11 PROCEDURE — 20550 NJX 1 TENDON SHEATH/LIGAMENT: CPT | Performed by: FAMILY MEDICINE

## 2023-04-11 RX ORDER — EPINEPHRINE 0.3 MG/.3ML
0.3 INJECTION SUBCUTANEOUS
Qty: 0.3 ML | Refills: 1 | Status: CANCELLED | OUTPATIENT
Start: 2023-04-11

## 2023-04-11 RX ADMIN — METHYLPREDNISOLONE ACETATE 40 MG: 40 INJECTION, SUSPENSION INTRA-ARTICULAR; INTRALESIONAL; INTRAMUSCULAR; SOFT TISSUE at 10:54

## 2023-04-11 ASSESSMENT — PAIN SCALES - GENERAL: PAINLEVEL: SEVERE PAIN (6)

## 2023-04-11 NOTE — PROGRESS NOTES
Assessment & Plan       ICD-10-CM    1. Trigger finger, acquired  M65.30 INJECTION SINGLE TENDON SHEATH/LIGAMENT         I did review what he had done in 2016 and also discussed the etiology of trigger finger. Discussed option of injection and splinting. He has a couple weeks off work on vacation right now and wants an injection to help it heal.   I agreed to do this.   After informed consent was signed, I cleansed the skin over the palmar surface of the 1st MCP joint with betadine and injected a mixture of 1/4 cc of 1% plain lidocaine and 1/4 cc of Depo-Medrol 40 mg per mill into the A1 pulley of the right index finger.   He tolerated this well.  A Band-Aid was placed.  Advised to keep this area clean and avoid overuse.  Follow-up if not helping and I would recommend bracing.  He declined bracing today.    20 minutes spent by me on the date of the encounter doing chart review, history and exam, documentation and further activities per the note      Danyelle Malloy MD  [facility]    Ruthie Banks is a 52 year old, presenting for the following health issues:  Imm/Inj (Trigger point injection)        4/11/2023     3:59 PM   Additional Questions   Roomed by patricia BRYAN   Accompanied by None     Imm/Inj         Trigger point injection     He has had trigger finger for several years.  He had a cortisone shot in the right index finger in 2016 and it worked for about 6 years.  Just now in the past couple months it has been recurring.  He gets a locking sensation when he flexes the right index finger.  He is a , works with his hands a lot.  He is also diabetic.  He states his diabetes has been under excellent control lately.  His last A1c 4 months ago was in the 10 range, but since then in the past 2 months he has been taking a mixture of oils and honey and his sugars have been very good.  He has a diabetic follow-up coming up soon.      Review of Systems  "  Constitutional, HEENT, cardiovascular, pulmonary, gi and gu systems are negative, except as otherwise noted.      Objective    /70   Pulse 100   Temp 98  F (36.7  C) (Temporal)   Resp 20   Ht 1.748 m (5' 8.8\")   Wt 88.6 kg (195 lb 4 oz)   SpO2 97%   BMI 29.00 kg/m    Body mass index is 29 kg/m .  Physical Exam   Vitals noted.  Patient alert, oriented, and in no acute distress.   He does exhibit trigger finger of the right index finger.  He has full flexion but cannot extend without a catch at the A1 pulley.                "

## 2023-04-17 ENCOUNTER — APPOINTMENT (OUTPATIENT)
Dept: URGENT CARE | Facility: CLINIC | Age: 53
End: 2023-04-17
Payer: COMMERCIAL

## 2023-04-24 RX ORDER — METHYLPREDNISOLONE ACETATE 40 MG/ML
40 INJECTION, SUSPENSION INTRA-ARTICULAR; INTRALESIONAL; INTRAMUSCULAR; SOFT TISSUE ONCE
Status: COMPLETED | OUTPATIENT
Start: 2023-04-24 | End: 2023-04-11

## 2023-06-01 ENCOUNTER — HEALTH MAINTENANCE LETTER (OUTPATIENT)
Age: 53
End: 2023-06-01

## 2024-01-14 ENCOUNTER — HEALTH MAINTENANCE LETTER (OUTPATIENT)
Age: 54
End: 2024-01-14

## 2024-03-08 ENCOUNTER — HOSPITAL ENCOUNTER (EMERGENCY)
Facility: CLINIC | Age: 54
Discharge: HOME | End: 2024-03-08
Attending: STUDENT IN AN ORGANIZED HEALTH CARE EDUCATION/TRAINING PROGRAM | Admitting: STUDENT IN AN ORGANIZED HEALTH CARE EDUCATION/TRAINING PROGRAM
Payer: COMMERCIAL

## 2024-03-08 ENCOUNTER — APPOINTMENT (OUTPATIENT)
Dept: CT IMAGING | Facility: CLINIC | Age: 54
End: 2024-03-08
Attending: STUDENT IN AN ORGANIZED HEALTH CARE EDUCATION/TRAINING PROGRAM
Payer: COMMERCIAL

## 2024-03-08 VITALS
OXYGEN SATURATION: 98 % | SYSTOLIC BLOOD PRESSURE: 135 MMHG | BODY MASS INDEX: 27.23 KG/M2 | HEIGHT: 71 IN | DIASTOLIC BLOOD PRESSURE: 97 MMHG | HEART RATE: 74 BPM | RESPIRATION RATE: 20 BRPM | TEMPERATURE: 98 F

## 2024-03-08 DIAGNOSIS — R07.9 CHEST PAIN, UNSPECIFIED TYPE: ICD-10-CM
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
## 2024-03-08 DIAGNOSIS — S64.01XA INJURY OF RIGHT ULNAR NERVE AT WRIST, INITIAL ENCOUNTER: ICD-10-CM

## 2024-03-08 PROCEDURE — 99284 EMERGENCY DEPT VISIT MOD MDM: CPT | Performed by: STUDENT IN AN ORGANIZED HEALTH CARE EDUCATION/TRAINING PROGRAM

## 2024-03-08 PROCEDURE — 71250 CT THORAX DX C-: CPT

## 2024-03-08 PROCEDURE — 99285 EMERGENCY DEPT VISIT HI MDM: CPT | Mod: 25 | Performed by: STUDENT IN AN ORGANIZED HEALTH CARE EDUCATION/TRAINING PROGRAM

## 2024-03-08 RX ORDER — IBUPROFEN 800 MG/1
800 TABLET, FILM COATED ORAL EVERY 8 HOURS PRN
COMMUNITY

## 2024-03-08 RX ORDER — OXYCODONE HYDROCHLORIDE 5 MG/1
5 TABLET ORAL EVERY 6 HOURS PRN
Qty: 12 TABLET | Refills: 0 | Status: SHIPPED | OUTPATIENT
Start: 2024-03-08 | End: 2024-03-11

## 2024-03-08 ASSESSMENT — COLUMBIA-SUICIDE SEVERITY RATING SCALE - C-SSRS
1. IN THE PAST MONTH, HAVE YOU WISHED YOU WERE DEAD OR WISHED YOU COULD GO TO SLEEP AND NOT WAKE UP?: NO
2. HAVE YOU ACTUALLY HAD ANY THOUGHTS OF KILLING YOURSELF IN THE PAST MONTH?: NO
6. HAVE YOU EVER DONE ANYTHING, STARTED TO DO ANYTHING, OR PREPARED TO DO ANYTHING TO END YOUR LIFE?: NO

## 2024-03-08 ASSESSMENT — ACTIVITIES OF DAILY LIVING (ADL): ADLS_ACUITY_SCORE: 35

## 2024-03-08 NOTE — DISCHARGE INSTRUCTIONS
Return if any symptoms worsen or continue to occur without any significant benefit or any new symptoms occur that need evaluation.  Work note is provided if needed.  And pain medications were sent to your pharmacy otherwise mainstay of treatment should be the ibuprofen and Tylenol.  
No

## 2024-03-08 NOTE — Clinical Note
Darren Tenorio was seen and treated in our emergency department on 3/8/2024.  He may return to work on 03/11/2024.       If you have any questions or concerns, please don't hesitate to call.      Aries Winters MD

## 2024-03-08 NOTE — ED TRIAGE NOTES
Pt reports last night he was cleaning out his boat and when he was getting out his foot missed the trailer and he fall landing on the left side of his chest and arm. Pt reports this morning he woke up with increased pain to area and is also having numbness to the 4th and 5th finger of his right hand.      Triage Assessment (Adult)       Row Name 03/08/24 0721          Triage Assessment    Airway WDL WDL        Respiratory WDL    Respiratory WDL WDL        Peripheral/Neurovascular WDL    Peripheral Neurovascular WDL WDL
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
## 2024-04-02 NOTE — ED NOTES
Pt dressed and in a hurry to leave.  Unable to complete second set of vital signs.  Reviewed discharge instructions.    Detail Level: Detailed Was A Bandage Applied: Yes Punch Size In Mm: 4 Size Of Lesion In Cm (Optional): 0 Depth Of Punch Biopsy: dermis Biopsy Type: H and E Anesthesia Type: 0.5% lidocaine with 1:200,000 epinephrine and a 1:10 solution of 8.4% sodium bicarbonate Anesthesia Volume In Cc: 0.5 Hemostasis: None Epidermal Sutures: 5-0 Nylon Wound Care: Vaseline Dressing: pressure dressing Suture Removal: 14 days Patient Will Remove Sutures At Home?: No Lab: [id number] Consent: Written consent was obtained and risks were reviewed including but not limited to scarring, infection, bleeding, scabbing, incomplete removal, nerve damage and allergy to anesthesia. Post-Care Instructions: I reviewed with the patient in detail post-care instructions. Patient is to keep the biopsy site dry overnight, and then apply bacitracin twice daily until healed. Patient may apply hydrogen peroxide soaks to remove any crusting. Home Suture Removal Text: Patient was provided a home suture removal kit and will remove their sutures at home.  If they have any questions or difficulties they will call the office. Notification Instructions: Patient will be notified of biopsy results. However, patient instructed to call the office if not contacted within 2 weeks. Billing Type: Third-Party Bill Information: Selecting Yes will display possible errors in your note based on the variables you have selected. This validation is only offered as a suggestion for you. PLEASE NOTE THAT THE VALIDATION TEXT WILL BE REMOVED WHEN YOU FINALIZE YOUR NOTE. IF YOU WANT TO FAX A PRELIMINARY NOTE YOU WILL NEED TO TOGGLE THIS TO 'NO' IF YOU DO NOT WANT IT IN YOUR FAXED NOTE.

## 2024-08-11 ENCOUNTER — HEALTH MAINTENANCE LETTER (OUTPATIENT)
Age: 54
End: 2024-08-11

## 2025-01-26 ENCOUNTER — HEALTH MAINTENANCE LETTER (OUTPATIENT)
Age: 55
End: 2025-01-26

## 2025-02-23 ENCOUNTER — HEALTH MAINTENANCE LETTER (OUTPATIENT)
Age: 55
End: 2025-02-23

## (undated) DEVICE — GLOVE EXAM NITRILE LG

## (undated) DEVICE — LUBRICATING JELLY 4.25OZ

## (undated) DEVICE — TUBING SUCTION 12"X1/4" N612

## (undated) DEVICE — SOL WATER IRRIG 1000ML BOTTLE 07139-09

## (undated) DEVICE — KIT ENDO TURNOVER/PROCEDURE CARRY-ON 101822

## (undated) RX ORDER — PROPOFOL 10 MG/ML
INJECTION, EMULSION INTRAVENOUS
Status: DISPENSED
Start: 2023-01-10

## (undated) RX ORDER — LIDOCAINE HYDROCHLORIDE 10 MG/ML
INJECTION, SOLUTION EPIDURAL; INFILTRATION; INTRACAUDAL; PERINEURAL
Status: DISPENSED
Start: 2023-01-10

## (undated) RX ORDER — GLYCOPYRROLATE 0.2 MG/ML
INJECTION INTRAMUSCULAR; INTRAVENOUS
Status: DISPENSED
Start: 2023-01-10